# Patient Record
Sex: FEMALE | Race: WHITE | NOT HISPANIC OR LATINO | Employment: FULL TIME | URBAN - METROPOLITAN AREA
[De-identification: names, ages, dates, MRNs, and addresses within clinical notes are randomized per-mention and may not be internally consistent; named-entity substitution may affect disease eponyms.]

---

## 2017-04-03 ENCOUNTER — APPOINTMENT (OUTPATIENT)
Dept: MAMMOGRAPHY | Facility: CLINIC | Age: 52
End: 2017-04-03

## 2017-04-03 ENCOUNTER — OFFICE VISIT (OUTPATIENT)
Dept: OBSTETRICS AND GYNECOLOGY | Facility: CLINIC | Age: 52
End: 2017-04-03

## 2017-04-03 VITALS
SYSTOLIC BLOOD PRESSURE: 134 MMHG | HEIGHT: 66 IN | BODY MASS INDEX: 21.53 KG/M2 | DIASTOLIC BLOOD PRESSURE: 60 MMHG | WEIGHT: 134 LBS

## 2017-04-03 DIAGNOSIS — Z12.31 VISIT FOR SCREENING MAMMOGRAM: ICD-10-CM

## 2017-04-03 DIAGNOSIS — Z01.419 WELL WOMAN EXAM WITH ROUTINE GYNECOLOGICAL EXAM: Primary | ICD-10-CM

## 2017-04-03 PROCEDURE — 77067 SCR MAMMO BI INCL CAD: CPT | Performed by: OBSTETRICS & GYNECOLOGY

## 2017-04-03 PROCEDURE — 99396 PREV VISIT EST AGE 40-64: CPT | Performed by: OBSTETRICS & GYNECOLOGY

## 2017-04-03 NOTE — PROGRESS NOTES
Huber Silveira is a 51 y.o. female is here today as a self referral for annual.    History of Present Illness-here today for annual exam and checkup.    The following portions of the patient's history were reviewed and updated as appropriate: allergies, current medications, past family history, past medical history, past social history, past surgical history and problem list.    Review of Systems   Constitutional: Negative.    HENT: Negative.    Eyes: Negative.    Respiratory: Negative.    Cardiovascular: Negative.    Gastrointestinal: Negative.    Endocrine: Negative.    Genitourinary: Negative.    Musculoskeletal: Negative.    Skin: Negative.    Allergic/Immunologic: Negative.    Neurological: Negative.    Hematological: Negative.    Psychiatric/Behavioral: Negative.        Objective   Physical Exam   Constitutional: She is oriented to person, place, and time. She appears well-developed and well-nourished.   HENT:   Head: Normocephalic and atraumatic.   Nose: Nose normal.   Eyes: Conjunctivae and EOM are normal. Pupils are equal, round, and reactive to light.   Neck: Normal range of motion. Neck supple. No thyromegaly present.   Cardiovascular: Normal rate, regular rhythm, normal heart sounds and intact distal pulses.  Exam reveals no gallop.    No murmur heard.  Pulmonary/Chest: Effort normal and breath sounds normal. No respiratory distress. She has no wheezes. She exhibits no mass, no tenderness, no swelling and no retraction. Right breast exhibits no inverted nipple, no mass, no nipple discharge, no skin change and no tenderness. Left breast exhibits no inverted nipple, no mass, no nipple discharge, no skin change and no tenderness.   Abdominal: Soft. Bowel sounds are normal. She exhibits no distension and no mass. There is no tenderness.   Genitourinary: Rectum normal, vagina normal and uterus normal. There is no rash, tenderness, lesion or injury on the right labia. There is no rash,  tenderness, lesion or injury on the left labia. Uterus is not enlarged and not tender. Cervix exhibits no motion tenderness and no discharge. Right adnexum displays no mass, no tenderness and no fullness. Left adnexum displays no mass, no tenderness and no fullness.   Musculoskeletal: Normal range of motion. She exhibits no edema, tenderness or deformity.   Neurological: She is alert and oriented to person, place, and time.   Skin: Skin is warm and dry.   Psychiatric: She has a normal mood and affect. Her behavior is normal. Judgment and thought content normal.   Nursing note and vitals reviewed.        Assessment/Plan   Problems Addressed this Visit     None      Visit Diagnoses     Well woman exam with routine gynecological exam    -  Primary        Pap smear and mammogram done.  Current with colonoscopy.  DEXA scan discussed about mid 50s.

## 2017-04-05 LAB
CONV .: NORMAL
CYTOLOGIST CVX/VAG CYTO: NORMAL
CYTOLOGY CVX/VAG DOC THIN PREP: NORMAL
DX ICD CODE: NORMAL
HIV 1 & 2 AB SER-IMP: NORMAL
OTHER STN SPEC: NORMAL
PATH REPORT.FINAL DX SPEC: NORMAL
STAT OF ADQ CVX/VAG CYTO-IMP: NORMAL

## 2018-03-09 ENCOUNTER — TRANSCRIBE ORDERS (OUTPATIENT)
Dept: ADMINISTRATIVE | Facility: HOSPITAL | Age: 53
End: 2018-03-09

## 2018-03-09 DIAGNOSIS — Z12.39 SCREENING BREAST EXAMINATION: Primary | ICD-10-CM

## 2018-04-06 ENCOUNTER — OFFICE VISIT (OUTPATIENT)
Dept: OBSTETRICS AND GYNECOLOGY | Facility: CLINIC | Age: 53
End: 2018-04-06

## 2018-04-06 ENCOUNTER — HOSPITAL ENCOUNTER (OUTPATIENT)
Dept: MAMMOGRAPHY | Facility: HOSPITAL | Age: 53
Discharge: HOME | End: 2018-04-06
Attending: OBSTETRICS & GYNECOLOGY | Admitting: OBSTETRICS & GYNECOLOGY

## 2018-04-06 VITALS
SYSTOLIC BLOOD PRESSURE: 130 MMHG | DIASTOLIC BLOOD PRESSURE: 80 MMHG | HEIGHT: 66 IN | WEIGHT: 130 LBS | BODY MASS INDEX: 20.89 KG/M2

## 2018-04-06 DIAGNOSIS — Z12.39 SCREENING BREAST EXAMINATION: ICD-10-CM

## 2018-04-06 DIAGNOSIS — Z01.419 WELL WOMAN EXAM WITH ROUTINE GYNECOLOGICAL EXAM: Primary | ICD-10-CM

## 2018-04-06 PROCEDURE — 77067 SCR MAMMO BI INCL CAD: CPT

## 2018-04-06 PROCEDURE — 99396 PREV VISIT EST AGE 40-64: CPT | Performed by: OBSTETRICS & GYNECOLOGY

## 2018-04-06 NOTE — PROGRESS NOTES
Huber Silveira is a 52 y.o. female is here today as a self referral for annual.    History of Present Illness-Here today for annual exam and checkup    The following portions of the patient's history were reviewed and updated as appropriate: allergies, current medications, past family history, past medical history, past social history, past surgical history and problem list.    Review of Systems   Constitutional: Negative.    HENT: Negative.    Eyes: Negative.    Respiratory: Negative.    Cardiovascular: Negative.    Gastrointestinal: Negative.    Endocrine: Negative.    Genitourinary: Negative.    Musculoskeletal: Negative.    Skin: Negative.    Allergic/Immunologic: Negative.    Neurological: Negative.    Hematological: Negative.    Psychiatric/Behavioral: Negative.        Objective   Physical Exam   Constitutional: She is oriented to person, place, and time. She appears well-developed and well-nourished.   HENT:   Head: Normocephalic and atraumatic.   Nose: Nose normal.   Eyes: Conjunctivae and EOM are normal. Pupils are equal, round, and reactive to light.   Neck: Normal range of motion. Neck supple. No thyromegaly present.   Cardiovascular: Normal rate, regular rhythm, normal heart sounds and intact distal pulses.  Exam reveals no gallop.    No murmur heard.  Pulmonary/Chest: Effort normal and breath sounds normal. No respiratory distress. She has no wheezes. She exhibits no mass, no tenderness, no swelling and no retraction. Right breast exhibits no inverted nipple, no mass, no nipple discharge, no skin change and no tenderness. Left breast exhibits no inverted nipple, no mass, no nipple discharge, no skin change and no tenderness.   Abdominal: Soft. Bowel sounds are normal. She exhibits no distension and no mass. There is no tenderness.   Genitourinary: Rectum normal, vagina normal and uterus normal. There is no rash, tenderness, lesion or injury on the right labia. There is no rash,  tenderness, lesion or injury on the left labia. Uterus is not enlarged and not tender. Cervix exhibits no motion tenderness and no discharge. Right adnexum displays no mass, no tenderness and no fullness. Left adnexum displays no mass, no tenderness and no fullness.   Musculoskeletal: Normal range of motion. She exhibits no edema, tenderness or deformity.   Neurological: She is alert and oriented to person, place, and time.   Skin: Skin is warm and dry.   Psychiatric: She has a normal mood and affect. Her behavior is normal. Judgment and thought content normal.   Nursing note and vitals reviewed.        Assessment/Plan   Problems Addressed this Visit     None      Visit Diagnoses     Well woman exam with routine gynecological exam    -  Primary    Relevant Orders    IGP,rfx Aptima HPV All Pth        Pap smear and mammogram done today.  Current with colonoscopy.  DEXA scan planned for next annual checkup.

## 2018-04-13 LAB
CONV .: ABNORMAL
CYTOLOGIST CVX/VAG CYTO: ABNORMAL
CYTOLOGY CVX/VAG DOC THIN PREP: ABNORMAL
DX ICD CODE: ABNORMAL
DX ICD CODE: ABNORMAL
HIV 1 & 2 AB SER-IMP: ABNORMAL
HPV I/H RISK 4 DNA CVX QL PROBE+SIG AMP: POSITIVE
OTHER STN SPEC: ABNORMAL
PATH REPORT.FINAL DX SPEC: ABNORMAL
PATHOLOGIST CVX/VAG CYTO: ABNORMAL
STAT OF ADQ CVX/VAG CYTO-IMP: ABNORMAL

## 2018-04-26 ENCOUNTER — OFFICE VISIT (OUTPATIENT)
Dept: OBSTETRICS AND GYNECOLOGY | Facility: CLINIC | Age: 53
End: 2018-04-26

## 2018-04-26 VITALS — SYSTOLIC BLOOD PRESSURE: 130 MMHG | DIASTOLIC BLOOD PRESSURE: 78 MMHG

## 2018-04-26 DIAGNOSIS — R87.618 ABNORMAL PAPANICOLAOU SMEAR OF CERVIX WITH POSITIVE HUMAN PAPILLOMA VIRUS (HPV) TEST: Primary | ICD-10-CM

## 2018-04-26 PROCEDURE — 57456 ENDOCERV CURETTAGE W/SCOPE: CPT | Performed by: OBSTETRICS & GYNECOLOGY

## 2018-04-26 NOTE — PROGRESS NOTES
Procedure   Procedures-Colposcopy and biopsy    Procedure-speculum placed into the vagina and 2 applications of acetic acid.  There was no evidence of abnormal vessels or white epithelium.  Spira brush biopsy obtained and included the endocervix.  Silver nitrate was applied to the biopsied surface.  There was minimal bleeding and patient tolerated the procedure well.  Patient to call in 1 week for biopsy report.

## 2018-05-01 LAB
DX ICD CODE: NORMAL
DX ICD CODE: NORMAL
PATH REPORT.FINAL DX SPEC: NORMAL
PATH REPORT.GROSS SPEC: NORMAL
PATH REPORT.SITE OF ORIGIN SPEC: NORMAL
PATHOLOGIST NAME: NORMAL
PAYMENT PROCEDURE: NORMAL

## 2019-04-23 ENCOUNTER — APPOINTMENT (OUTPATIENT)
Dept: WOMENS IMAGING | Facility: HOSPITAL | Age: 54
End: 2019-04-23

## 2019-04-23 ENCOUNTER — PROCEDURE VISIT (OUTPATIENT)
Dept: OBSTETRICS AND GYNECOLOGY | Facility: CLINIC | Age: 54
End: 2019-04-23

## 2019-04-23 DIAGNOSIS — Z12.31 VISIT FOR SCREENING MAMMOGRAM: Primary | ICD-10-CM

## 2019-04-23 PROCEDURE — 77067 SCR MAMMO BI INCL CAD: CPT | Performed by: OBSTETRICS & GYNECOLOGY

## 2019-04-23 PROCEDURE — 77067 SCR MAMMO BI INCL CAD: CPT | Performed by: RADIOLOGY

## 2019-07-17 ENCOUNTER — OFFICE VISIT (OUTPATIENT)
Dept: OBSTETRICS AND GYNECOLOGY | Facility: CLINIC | Age: 54
End: 2019-07-17

## 2019-07-17 VITALS
SYSTOLIC BLOOD PRESSURE: 120 MMHG | DIASTOLIC BLOOD PRESSURE: 82 MMHG | BODY MASS INDEX: 21.38 KG/M2 | WEIGHT: 133 LBS | HEIGHT: 66 IN

## 2019-07-17 DIAGNOSIS — Z01.419 ENCOUNTER FOR GYNECOLOGICAL EXAMINATION WITHOUT ABNORMAL FINDING: Primary | ICD-10-CM

## 2019-07-17 DIAGNOSIS — Z87.42 HISTORY OF ABNORMAL CERVICAL PAP SMEAR: ICD-10-CM

## 2019-07-17 DIAGNOSIS — Z12.39 SCREENING FOR BREAST CANCER: ICD-10-CM

## 2019-07-17 DIAGNOSIS — N95.1 CLIMACTERIC: ICD-10-CM

## 2019-07-17 LAB
DEVELOPER EXPIRATION DATE: NORMAL
DEVELOPER LOT NUMBER: NORMAL
EXPIRATION DATE: NORMAL
FECAL OCCULT BLOOD SCREEN, POC: NEGATIVE
Lab: NORMAL
NEGATIVE CONTROL: NEGATIVE
POSITIVE CONTROL: POSITIVE

## 2019-07-17 PROCEDURE — 99396 PREV VISIT EST AGE 40-64: CPT | Performed by: OBSTETRICS & GYNECOLOGY

## 2019-07-17 PROCEDURE — 82274 ASSAY TEST FOR BLOOD FECAL: CPT | Performed by: OBSTETRICS & GYNECOLOGY

## 2019-07-17 NOTE — PROGRESS NOTES
Subjective    Chief Complaint   Patient presents with   • Gynecologic Exam     AE      History of Present Illness    Ayana Silveira is a 53 y.o. female who presents for annual exam.  Non-smoker.  Mammogram 2019-.  Patient had previous ASCUS positive HPV with HPV changes on biopsy.  Planning repeat Pap and DEXA scan in 6 months.  Colonoscopy at age 48 was negative and due again at age 58.  No bleeding and no problems.    Obstetric History:  OB History      Para Term  AB Living    3 3 3          SAB TAB Ectopic Molar Multiple Live Births                        Menstrual History:     No LMP recorded. Patient is postmenopausal.       History reviewed. No pertinent past medical history.  Family History   Problem Relation Age of Onset   • Breast cancer Maternal Grandmother    • Breast cancer Paternal Grandmother      Social History     Tobacco Use   Smoking Status Never Smoker     Counseling given: Not Answered      The following portions of the patient's history were reviewed and updated as appropriate: allergies, current medications, past family history, past medical history, past social history, past surgical history and problem list.    Review of Systems   Constitutional: Negative.  Negative for fever and unexpected weight change.   HENT: Negative.    Respiratory: Negative for shortness of breath and wheezing.    Cardiovascular: Negative for chest pain, palpitations and leg swelling.   Gastrointestinal: Negative for abdominal pain, anal bleeding and blood in stool.   Genitourinary: Negative for dysuria, pelvic pain, urgency, vaginal bleeding, vaginal discharge and vaginal pain.   Skin: Negative.    Neurological: Negative.    Hematological: Negative.  Negative for adenopathy.   Psychiatric/Behavioral: Negative.  Negative for dysphoric mood. The patient is not nervous/anxious.             Objective   Physical Exam   Constitutional: She is oriented to person, place, and time. Vital signs are normal.  "She appears well-developed and well-nourished.   HENT:   Head: Normocephalic.   Neck: Trachea normal. No tracheal deviation present. No thyromegaly present.   Cardiovascular: Normal rate, regular rhythm and normal heart sounds.   No murmur heard.  Pulmonary/Chest: Effort normal and breath sounds normal.   Breasts without masses, tenderness or nipple discharge   Abdominal: Soft. Normal appearance. She exhibits no mass. There is no hepatosplenomegaly. There is no tenderness. No hernia.   Genitourinary: Rectum normal, vagina normal and uterus normal. Rectal exam shows guaiac negative stool. Uterus is not enlarged and not tender. Cervix exhibits no motion tenderness. Right adnexum displays no mass and no tenderness. Left adnexum displays no mass and no tenderness. No vaginal discharge found.   Genitourinary Comments: External genitalia normal    Lymphadenopathy:     She has no cervical adenopathy.     She has no axillary adenopathy.   Neurological: She is alert and oriented to person, place, and time.   Skin: Skin is warm and dry. No rash noted.   Psychiatric: She has a normal mood and affect. Her behavior is normal. Cognition and memory are normal.       /82   Ht 167.6 cm (66\")   Wt 60.3 kg (133 lb)   BMI 21.47 kg/m²     Assessment/Plan   Ayana was seen today for gynecologic exam.    Diagnoses and all orders for this visit:    Encounter for gynecological examination without abnormal finding  -     IGP,rfx Aptima HPV All Pth  -     POC Occult Blood Stool    Climacteric    Screening for breast cancer        RTO 1 year     Counseled about starting calcium with vitamin D twice daily and scheduling DEXA scan.         "

## 2019-07-22 LAB
CONV .: NORMAL
CYTOLOGIST CVX/VAG CYTO: NORMAL
CYTOLOGY CVX/VAG DOC CYTO: NORMAL
CYTOLOGY CVX/VAG DOC THIN PREP: NORMAL
DX ICD CODE: NORMAL
HIV 1 & 2 AB SER-IMP: NORMAL
OTHER STN SPEC: NORMAL
STAT OF ADQ CVX/VAG CYTO-IMP: NORMAL

## 2019-11-21 ENCOUNTER — OFFICE VISIT (OUTPATIENT)
Dept: OBSTETRICS AND GYNECOLOGY | Facility: CLINIC | Age: 54
End: 2019-11-21

## 2019-11-21 VITALS
HEIGHT: 66 IN | SYSTOLIC BLOOD PRESSURE: 162 MMHG | WEIGHT: 133 LBS | DIASTOLIC BLOOD PRESSURE: 101 MMHG | BODY MASS INDEX: 21.38 KG/M2

## 2019-11-21 DIAGNOSIS — N64.4 BREAST PAIN, LEFT: Primary | ICD-10-CM

## 2019-11-21 DIAGNOSIS — N64.4 BREAST TENDERNESS IN FEMALE: Primary | ICD-10-CM

## 2019-11-21 PROCEDURE — 99213 OFFICE O/P EST LOW 20 MIN: CPT | Performed by: OBSTETRICS & GYNECOLOGY

## 2019-11-21 NOTE — PROGRESS NOTES
"Subjective    Chief Complaint   Patient presents with   • Follow-up     Left Breast Pain, Tenderness      History of Present Illness    Ayana Silveira is a 54 y.o. female who presents for new onset over the last several 7 days of left breast tenderness.  No mass or other associated symptoms.  Mammogram 2019 was totally negative.  Patient does not take a lot of caffeine.    Obstetric History:  OB History      Para Term  AB Living    3 3 3          SAB TAB Ectopic Molar Multiple Live Births                        Menstrual History:     No LMP recorded. Patient is postmenopausal.       History reviewed. No pertinent past medical history.  Family History   Problem Relation Age of Onset   • Breast cancer Maternal Grandmother    • Breast cancer Paternal Grandmother        The following portions of the patient's history were reviewed and updated as appropriate: allergies, current medications, past family history, past medical history, past social history and problem list.    Review of Systems  As per HPI       Objective   Physical Exam  Examination of right breast totally negative for tenderness or mass.  Examination of left breast tenderness noted in the outer quadrants with no mass or nipple discharge.  Due to recent exercise could be musculoskeletal but fibrocystic disease also a possibility.  BP (!) 162/101   Ht 167.6 cm (66\")   Wt 60.3 kg (133 lb)   BMI 21.47 kg/m²     Assessment/Plan   Ayana was seen today for follow-up.    Diagnoses and all orders for this visit:    Breast pain, left        Scheduling left diagnostic mammogram.  Since does not take much caffeine, will start 400 IU vitamin D a day.  If mammogram negative the pain continues patient will call to schedule a possible consultation with breast surgeon.  Patient is happy with this plan.               "

## 2019-12-10 ENCOUNTER — APPOINTMENT (OUTPATIENT)
Dept: WOMENS IMAGING | Facility: HOSPITAL | Age: 54
End: 2019-12-10

## 2019-12-10 PROCEDURE — G0279 TOMOSYNTHESIS, MAMMO: HCPCS | Performed by: RADIOLOGY

## 2019-12-10 PROCEDURE — 77065 DX MAMMO INCL CAD UNI: CPT | Performed by: RADIOLOGY

## 2019-12-10 PROCEDURE — 76641 ULTRASOUND BREAST COMPLETE: CPT | Performed by: RADIOLOGY

## 2019-12-10 PROCEDURE — 77061 BREAST TOMOSYNTHESIS UNI: CPT | Performed by: RADIOLOGY

## 2019-12-11 DIAGNOSIS — N64.4 BREAST TENDERNESS IN FEMALE: ICD-10-CM

## 2020-01-06 DIAGNOSIS — N64.4 BREAST TENDERNESS: ICD-10-CM

## 2020-01-13 ENCOUNTER — OFFICE VISIT (OUTPATIENT)
Dept: OBSTETRICS AND GYNECOLOGY | Facility: CLINIC | Age: 55
End: 2020-01-13

## 2020-01-13 ENCOUNTER — PROCEDURE VISIT (OUTPATIENT)
Dept: OBSTETRICS AND GYNECOLOGY | Facility: CLINIC | Age: 55
End: 2020-01-13

## 2020-01-13 VITALS
DIASTOLIC BLOOD PRESSURE: 87 MMHG | SYSTOLIC BLOOD PRESSURE: 130 MMHG | WEIGHT: 136 LBS | BODY MASS INDEX: 21.86 KG/M2 | HEIGHT: 66 IN

## 2020-01-13 DIAGNOSIS — Z00.00 PREVENTATIVE HEALTH CARE: ICD-10-CM

## 2020-01-13 DIAGNOSIS — Z87.42 HISTORY OF ABNORMAL CERVICAL PAP SMEAR: Primary | ICD-10-CM

## 2020-01-13 DIAGNOSIS — M85.80 OSTEOPENIA, UNSPECIFIED LOCATION: ICD-10-CM

## 2020-01-13 DIAGNOSIS — Z78.0 POST-MENOPAUSAL: Primary | ICD-10-CM

## 2020-01-13 DIAGNOSIS — Z87.81 HISTORY OF FRACTURE: ICD-10-CM

## 2020-01-13 PROCEDURE — 77080 DXA BONE DENSITY AXIAL: CPT | Performed by: OBSTETRICS & GYNECOLOGY

## 2020-01-13 PROCEDURE — 99213 OFFICE O/P EST LOW 20 MIN: CPT | Performed by: OBSTETRICS & GYNECOLOGY

## 2020-01-13 NOTE — PROGRESS NOTES
"Subjective    Chief Complaint   Patient presents with   • Follow-up     Pt here for 6 month pap smear. Pt had DEXA scan today.       History of Present Illness    Ayana Silveira is a 54 y.o. female who presents for 6-month repeat Pap smear today.  Last Pap in July negative.  Previously had ASCUS positive HPV with cervical biopsy showing HPV changes.  Also had DEXA scan today which showed osteopenia of the hips but normal spine.  Patient on calcium and vitamin D so will recheck in 2 years.  Patient states that her breast tenderness has totally resolved.    Obstetric History:  OB History        3    Para   3    Term   3            AB        Living           SAB        TAB        Ectopic        Molar        Multiple        Live Births                   Menstrual History:     No LMP recorded. Patient is postmenopausal.       History reviewed. No pertinent past medical history.  Family History   Problem Relation Age of Onset   • Breast cancer Maternal Grandmother    • Breast cancer Paternal Grandmother        The following portions of the patient's history were reviewed and updated as appropriate: allergies, current medications, past medical history, past surgical history and problem list.    Review of Systems   Negative    Objective   Physical Exam  Vagina clear without blood or discharge  Cervix without lesion.  Pap smear performed.  Bimanual exam negative.  /87   Ht 167.6 cm (66\")   Wt 61.7 kg (136 lb)   BMI 21.95 kg/m²     Assessment/Plan   Ayana was seen today for follow-up.    Diagnoses and all orders for this visit:    History of abnormal cervical Pap smear    Osteopenia, unspecified location        Repeat in 6 months for annual exam with Pap smear and screening mammogram.               "

## 2020-07-20 ENCOUNTER — PROCEDURE VISIT (OUTPATIENT)
Dept: OBSTETRICS AND GYNECOLOGY | Facility: CLINIC | Age: 55
End: 2020-07-20

## 2020-07-20 ENCOUNTER — APPOINTMENT (OUTPATIENT)
Dept: WOMENS IMAGING | Facility: HOSPITAL | Age: 55
End: 2020-07-20

## 2020-07-20 ENCOUNTER — OFFICE VISIT (OUTPATIENT)
Dept: OBSTETRICS AND GYNECOLOGY | Facility: CLINIC | Age: 55
End: 2020-07-20

## 2020-07-20 VITALS
DIASTOLIC BLOOD PRESSURE: 80 MMHG | HEIGHT: 66 IN | BODY MASS INDEX: 22.02 KG/M2 | WEIGHT: 137 LBS | SYSTOLIC BLOOD PRESSURE: 130 MMHG

## 2020-07-20 DIAGNOSIS — N89.8 VAGINAL DISCHARGE: ICD-10-CM

## 2020-07-20 DIAGNOSIS — Z01.419 WOMEN'S ANNUAL ROUTINE GYNECOLOGICAL EXAMINATION: Primary | ICD-10-CM

## 2020-07-20 DIAGNOSIS — Z12.31 VISIT FOR SCREENING MAMMOGRAM: Primary | ICD-10-CM

## 2020-07-20 PROCEDURE — 77067 SCR MAMMO BI INCL CAD: CPT | Performed by: OBSTETRICS & GYNECOLOGY

## 2020-07-20 PROCEDURE — 77067 SCR MAMMO BI INCL CAD: CPT | Performed by: RADIOLOGY

## 2020-07-20 PROCEDURE — 99396 PREV VISIT EST AGE 40-64: CPT | Performed by: NURSE PRACTITIONER

## 2020-07-20 PROCEDURE — 77063 BREAST TOMOSYNTHESIS BI: CPT | Performed by: OBSTETRICS & GYNECOLOGY

## 2020-07-20 PROCEDURE — 77063 BREAST TOMOSYNTHESIS BI: CPT | Performed by: RADIOLOGY

## 2020-07-20 NOTE — PROGRESS NOTES
GYN Annual Exam     Chief Complaint   Patient presents with   • Gynecologic Exam     Last pap 20 (was a 6 month repeat) negative, no HPV. MG today. Colonoscopy . Dexa 20.        Ayana Silveira is a 54 y.o. female who presents for annual well woman exam. Periods absent for 10 days. She denies vaginal spotting or discharge. She completes SBE monthly. She has no complaints today    This is my first time meeting Ayana Silveira      OB History        3    Para   3    Term   3            AB        Living           SAB        TAB        Ectopic        Molar        Multiple        Live Births                    Mammogram: Today  Dexa scan:   Colonoscopy: -normal, rec f/u in 10 years  Last Pap : 2020-negative  History of abnormal Pap smear: yes -   Family history of uterine, colon or ovarian cancer: no  Family history of breast cancer: yes - MGM and PGM   History of abnormal mammogram: no    Menopause:  Bleeding since? no  Vasomotor symptoms: no  HRT: no  Incontinence?  no  Dyspareunia: no    History of physical abuse: no, History of sexual abuse: no and History of verbal/emotional abuse: no    Past Medical History:   Diagnosis Date   • Post-menopausal        Past Surgical History:   Procedure Laterality Date   • COLONOSCOPY     • TONSILLECTOMY           Current Outpatient Medications:   •  Calcium 500-100 MG-UNIT chewable tablet, Chew 1 tablet Every 4 (Four) Hours As Needed., Disp: , Rfl:   •  vitamin E 200 UNIT capsule, Take 200 Units by mouth Daily., Disp: , Rfl:     Allergies   Allergen Reactions   • Sulfa Antibiotics        Social History     Tobacco Use   • Smoking status: Never Smoker   Substance Use Topics   • Alcohol use: Yes     Frequency: Never   • Drug use: Not on file       Family History   Problem Relation Age of Onset   • Breast cancer Maternal Grandmother    • Breast cancer Paternal Grandmother        Review of Systems   Constitutional: Negative for chills  "and fever.   Gastrointestinal: Negative for abdominal distention and abdominal pain.   Endocrine: Negative for cold intolerance and heat intolerance.   Genitourinary: Negative for dyspareunia, dysuria, pelvic pain, vaginal bleeding, vaginal discharge and vaginal pain.   Musculoskeletal: Negative for back pain.   Neurological: Negative for dizziness and headaches.       /80   Ht 167.6 cm (66\")   Wt 62.1 kg (137 lb)   BMI 22.11 kg/m²     Physical Exam   Constitutional: She is oriented to person, place, and time. She appears well-developed and well-nourished.   HENT:   Head: Normocephalic and atraumatic.   Eyes: Pupils are equal, round, and reactive to light. Right eye exhibits no discharge.   Neck: Normal range of motion. Neck supple. No thyromegaly present.   Cardiovascular: Normal rate, regular rhythm and normal heart sounds.   No murmur heard.  Pulmonary/Chest: Effort normal and breath sounds normal. No respiratory distress. She has no wheezes. Right breast exhibits no inverted nipple, no mass, no nipple discharge, no skin change and no tenderness. Left breast exhibits no inverted nipple, no mass, no nipple discharge, no skin change and no tenderness. No breast swelling, tenderness, discharge or bleeding. Breasts are symmetrical.   Abdominal: Soft. She exhibits no distension and no mass. There is no tenderness. There is no rebound and no guarding. No hernia. Hernia confirmed negative in the right inguinal area and confirmed negative in the left inguinal area.   Genitourinary: Uterus normal. No labial fusion. There is no rash, tenderness, lesion, injury or Bartholin's cyst on the right labia. There is no rash, tenderness, lesion, injury or Bartholin's cyst on the left labia.   Cervix is not parous. Cervix does not exhibit motion tenderness, discharge, friability, lesion, polyp, nabothian cyst, eversion or pinkness. Right adnexum displays no mass, no tenderness and no fullness. Right adnexum is present and " palpable.Left adnexum displays no mass, no tenderness and no fullness. Left adnexum is present and palpable.Vagina exhibits no lesion and no loss of rugae. No erythema, tenderness or bleeding in the vagina. No foreign body in the vagina. No signs of injury around the vagina. Vaginal discharge (thick white discharge) found.   Musculoskeletal: Normal range of motion. She exhibits no edema.   Lymphadenopathy:     She has no cervical adenopathy.        Right: No inguinal adenopathy present.        Left: No inguinal adenopathy present.   Neurological: She is alert and oriented to person, place, and time. No cranial nerve deficit. Coordination normal.   Skin: Skin is warm and dry. No erythema.   Psychiatric: She has a normal mood and affect. Her behavior is normal. Judgment and thought content normal.   Nursing note and vitals reviewed.         Assessment     1) GYN annual well woman exam.   2) Postmenopausal      Plan     1) Breast Health - Clinical breast exam & mammogram yearly, Self breast awareness. Schedule screening mammogram.   2) Pap - up to date  3) STD-no  4) Smoking status- nonsmoker  5) Colon health - screening colonoscopy recommended if not up to date  6) Patient's Body mass index is 22.11 kg/m². BMI is within normal parameters. No follow-up required..  7) Bone health - Weight bearing exercise, dietary calcium recommendations and vitamin D  reviewed.   8) Encouraged 150 minutes of exercise per week if not medically contraindicated  9) NuSwab    Follow up prn and one year    Lara Carr, APRN  7/20/2020  12:18

## 2020-07-22 ENCOUNTER — TELEPHONE (OUTPATIENT)
Dept: OBSTETRICS AND GYNECOLOGY | Facility: CLINIC | Age: 55
End: 2020-07-22

## 2020-07-22 NOTE — TELEPHONE ENCOUNTER
Calling Ayana to discuss pap smear. No answer left message to return my call.    Lara Carr, APRN  7/22/2020  8:45am

## 2020-07-23 ENCOUNTER — TELEPHONE (OUTPATIENT)
Dept: OBSTETRICS AND GYNECOLOGY | Facility: CLINIC | Age: 55
End: 2020-07-23

## 2020-07-23 NOTE — TELEPHONE ENCOUNTER
Called pt and notified of need to collect pap smear. She is planning to come in on 7/28/20 after 9AM    Lara Carr, APRN  7/23/20  12:23pm

## 2020-07-28 ENCOUNTER — CLINICAL SUPPORT (OUTPATIENT)
Dept: OBSTETRICS AND GYNECOLOGY | Facility: CLINIC | Age: 55
End: 2020-07-28

## 2020-07-28 DIAGNOSIS — Z87.42 H/O ABNORMAL CERVICAL PAPANICOLAOU SMEAR: Primary | ICD-10-CM

## 2020-07-28 LAB
A VAGINAE DNA VAG QL NAA+PROBE: NORMAL SCORE
BVAB2 DNA VAG QL NAA+PROBE: NORMAL SCORE
C ALBICANS DNA VAG QL NAA+PROBE: NEGATIVE
C GLABRATA DNA VAG QL NAA+PROBE: NEGATIVE
MEGA1 DNA VAG QL NAA+PROBE: NORMAL SCORE

## 2020-07-28 NOTE — PROGRESS NOTES
No chief complaint on file.       SUBJECTIVE:     Ayana Silveira is a 54 y.o.  who presents for pap smear testing only. Annual exam completed recently, pap smear was not collected at that time.    Past Medical History:   Diagnosis Date   • Post-menopausal       Past Surgical History:   Procedure Laterality Date   • COLONOSCOPY     • TONSILLECTOMY        Social History     Tobacco Use   • Smoking status: Never Smoker   Substance Use Topics   • Alcohol use: Yes     Frequency: Never   • Drug use: Not on file     OB History    Para Term  AB Living   3 3 3         SAB TAB Ectopic Molar Multiple Live Births                    # Outcome Date GA Lbr Wali/2nd Weight Sex Delivery Anes PTL Lv   3 Term            2 Term            1 Term                   OBJECTIVE:       Physical Exam   Abdominal: Hernia confirmed negative in the right inguinal area and confirmed negative in the left inguinal area.   Genitourinary: Uterus normal. No labial fusion. There is no rash, tenderness, lesion, injury or Bartholin's cyst on the right labia. There is no rash, tenderness, lesion, injury or Bartholin's cyst on the left labia.   Cervix is not parous. Cervix does not exhibit motion tenderness, discharge, friability, lesion, polyp, nabothian cyst, eversion, pinkness or cyanosis. Right adnexum displays no mass, no tenderness and no fullness. Right adnexum is present and palpable.Left adnexum displays no mass, no tenderness and no fullness. Left adnexum is present and palpable.Vagina exhibits no lesion and no loss of rugae. No erythema, tenderness or bleeding in the vagina. No foreign body in the vagina. No signs of injury around the vagina. No vaginal discharge found.   Lymphadenopathy:        Right: No inguinal adenopathy present.        Left: No inguinal adenopathy present.       ASSESSMENT:   1) Hx of abnormal pap smear    PLAN:   Pap smear with HPV co-testing completed today    Follow up: one year for annual gyn  exam or PRN problems or concerns      Lara Carr, APRN  7/28/2020  10:45

## 2020-07-31 LAB
CYTOLOGIST CVX/VAG CYTO: NORMAL
CYTOLOGY CVX/VAG DOC CYTO: NORMAL
CYTOLOGY CVX/VAG DOC THIN PREP: NORMAL
DX ICD CODE: NORMAL
HIV 1 & 2 AB SER-IMP: NORMAL
HPV I/H RISK 4 DNA CVX QL PROBE+SIG AMP: NEGATIVE
OTHER STN SPEC: NORMAL
STAT OF ADQ CVX/VAG CYTO-IMP: NORMAL

## 2021-03-01 ENCOUNTER — OFFICE VISIT (OUTPATIENT)
Dept: FAMILY MEDICINE CLINIC | Facility: CLINIC | Age: 56
End: 2021-03-01

## 2021-03-01 VITALS
RESPIRATION RATE: 14 BRPM | HEART RATE: 67 BPM | OXYGEN SATURATION: 98 % | TEMPERATURE: 96.8 F | HEIGHT: 66 IN | BODY MASS INDEX: 22.58 KG/M2 | DIASTOLIC BLOOD PRESSURE: 82 MMHG | WEIGHT: 140.5 LBS | SYSTOLIC BLOOD PRESSURE: 134 MMHG

## 2021-03-01 DIAGNOSIS — Z00.00 ANNUAL PHYSICAL EXAM: Primary | ICD-10-CM

## 2021-03-01 DIAGNOSIS — Z11.59 ENCOUNTER FOR HEPATITIS C SCREENING TEST FOR LOW RISK PATIENT: ICD-10-CM

## 2021-03-01 DIAGNOSIS — E78.5 HYPERLIPIDEMIA, UNSPECIFIED HYPERLIPIDEMIA TYPE: ICD-10-CM

## 2021-03-01 DIAGNOSIS — R10.11 RIGHT UPPER QUADRANT PAIN: ICD-10-CM

## 2021-03-01 PROBLEM — S46.919A MUSCLE STRAIN OF UPPER EXTREMITY: Status: RESOLVED | Noted: 2021-03-01 | Resolved: 2021-03-01

## 2021-03-01 PROBLEM — S46.919A MUSCLE STRAIN OF UPPER EXTREMITY: Status: ACTIVE | Noted: 2021-03-01

## 2021-03-01 PROCEDURE — 99396 PREV VISIT EST AGE 40-64: CPT | Performed by: FAMILY MEDICINE
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
## 2021-03-02 LAB
ALBUMIN SERPL-MCNC: 4.7 G/DL (ref 3.5–5.2)
ALBUMIN/GLOB SERPL: 1.8 G/DL
ALP SERPL-CCNC: 47 U/L (ref 39–117)
ALT SERPL-CCNC: 49 U/L (ref 1–33)
AST SERPL-CCNC: 49 U/L (ref 1–32)
BASOPHILS # BLD AUTO: 0.06 10*3/MM3 (ref 0–0.2)
BASOPHILS NFR BLD AUTO: 1 % (ref 0–1.5)
BILIRUB SERPL-MCNC: 0.7 MG/DL (ref 0–1.2)
BUN SERPL-MCNC: 10 MG/DL (ref 6–20)
BUN/CREAT SERPL: 15.4 (ref 7–25)
CALCIUM SERPL-MCNC: 9.6 MG/DL (ref 8.6–10.5)
CHLORIDE SERPL-SCNC: 101 MMOL/L (ref 98–107)
CHOLEST SERPL-MCNC: 315 MG/DL (ref 0–200)
CO2 SERPL-SCNC: 27.2 MMOL/L (ref 22–29)
CREAT SERPL-MCNC: 0.65 MG/DL (ref 0.57–1)
EOSINOPHIL # BLD AUTO: 0.11 10*3/MM3 (ref 0–0.4)
EOSINOPHIL NFR BLD AUTO: 1.8 % (ref 0.3–6.2)
ERYTHROCYTE [DISTWIDTH] IN BLOOD BY AUTOMATED COUNT: 12 % (ref 12.3–15.4)
GLOBULIN SER CALC-MCNC: 2.6 GM/DL
GLUCOSE SERPL-MCNC: 94 MG/DL (ref 65–99)
HCT VFR BLD AUTO: 39.3 % (ref 34–46.6)
HCV AB S/CO SERPL IA: <0.1 S/CO RATIO (ref 0–0.9)
HDLC SERPL-MCNC: 110 MG/DL (ref 40–60)
HGB BLD-MCNC: 13.6 G/DL (ref 12–15.9)
IMM GRANULOCYTES # BLD AUTO: 0.01 10*3/MM3 (ref 0–0.05)
IMM GRANULOCYTES NFR BLD AUTO: 0.2 % (ref 0–0.5)
LDLC SERPL CALC-MCNC: 184 MG/DL (ref 0–100)
LYMPHOCYTES # BLD AUTO: 2.02 10*3/MM3 (ref 0.7–3.1)
LYMPHOCYTES NFR BLD AUTO: 33.2 % (ref 19.6–45.3)
MCH RBC QN AUTO: 35.4 PG (ref 26.6–33)
MCHC RBC AUTO-ENTMCNC: 34.6 G/DL (ref 31.5–35.7)
MCV RBC AUTO: 102.3 FL (ref 79–97)
MONOCYTES # BLD AUTO: 0.63 10*3/MM3 (ref 0.1–0.9)
MONOCYTES NFR BLD AUTO: 10.3 % (ref 5–12)
NEUTROPHILS # BLD AUTO: 3.26 10*3/MM3 (ref 1.7–7)
NEUTROPHILS NFR BLD AUTO: 53.5 % (ref 42.7–76)
NRBC BLD AUTO-RTO: 0 /100 WBC (ref 0–0.2)
PLATELET # BLD AUTO: 240 10*3/MM3 (ref 140–450)
POTASSIUM SERPL-SCNC: 4.9 MMOL/L (ref 3.5–5.2)
PROT SERPL-MCNC: 7.3 G/DL (ref 6–8.5)
RBC # BLD AUTO: 3.84 10*6/MM3 (ref 3.77–5.28)
SODIUM SERPL-SCNC: 141 MMOL/L (ref 136–145)
TRIGL SERPL-MCNC: 126 MG/DL (ref 0–150)
VLDLC SERPL CALC-MCNC: 21 MG/DL (ref 5–40)
WBC # BLD AUTO: 6.09 10*3/MM3 (ref 3.4–10.8)

## 2021-03-03 ENCOUNTER — TELEPHONE (OUTPATIENT)
Dept: FAMILY MEDICINE CLINIC | Facility: CLINIC | Age: 56
End: 2021-03-03

## 2021-03-03 NOTE — TELEPHONE ENCOUNTER
Caller: Ayana Silveira    Relationship: Self    Best call back number: [phone]       Caller requesting test results: LABS DONE ON 3/1

## 2021-03-06 DIAGNOSIS — R74.8 ELEVATED LIVER ENZYMES: Primary | ICD-10-CM

## 2021-03-09 ENCOUNTER — HOSPITAL ENCOUNTER (OUTPATIENT)
Dept: ULTRASOUND IMAGING | Facility: HOSPITAL | Age: 56
Discharge: HOME | End: 2021-03-09
Admitting: FAMILY MEDICINE

## 2021-03-09 DIAGNOSIS — R10.11 RIGHT UPPER QUADRANT PAIN: ICD-10-CM

## 2021-03-09 PROCEDURE — 76705 ECHO EXAM OF ABDOMEN: CPT

## 2021-03-30 ENCOUNTER — BULK ORDERING (OUTPATIENT)
Dept: CASE MANAGEMENT | Facility: OTHER | Age: 56
End: 2021-03-30

## 2021-03-30 DIAGNOSIS — Z23 IMMUNIZATION DUE: ICD-10-CM

## 2021-05-10 ENCOUNTER — TELEPHONE (OUTPATIENT)
Dept: OBSTETRICS AND GYNECOLOGY | Facility: CLINIC | Age: 56
End: 2021-05-10

## 2021-05-10 NOTE — TELEPHONE ENCOUNTER
Attempted to contact patient about 6 month FU with [facility]. Left general VM to call office.        Patient needs to schedule 6 month FU with [facility] in June. [phone]

## 2021-07-22 ENCOUNTER — APPOINTMENT (OUTPATIENT)
Dept: WOMENS IMAGING | Facility: HOSPITAL | Age: 56
End: 2021-07-22

## 2021-07-22 ENCOUNTER — PROCEDURE VISIT (OUTPATIENT)
Dept: OBSTETRICS AND GYNECOLOGY | Facility: CLINIC | Age: 56
End: 2021-07-22

## 2021-07-22 DIAGNOSIS — Z12.31 VISIT FOR SCREENING MAMMOGRAM: Primary | ICD-10-CM

## 2021-07-22 PROCEDURE — 77067 SCR MAMMO BI INCL CAD: CPT | Performed by: RADIOLOGY

## 2021-07-22 PROCEDURE — 77067 SCR MAMMO BI INCL CAD: CPT | Performed by: OBSTETRICS & GYNECOLOGY

## 2021-07-22 PROCEDURE — 77063 BREAST TOMOSYNTHESIS BI: CPT | Performed by: OBSTETRICS & GYNECOLOGY

## 2021-07-22 PROCEDURE — 77063 BREAST TOMOSYNTHESIS BI: CPT | Performed by: RADIOLOGY

## 2021-10-20 ENCOUNTER — OFFICE VISIT (OUTPATIENT)
Dept: OBSTETRICS AND GYNECOLOGY | Facility: CLINIC | Age: 56
End: 2021-10-20

## 2021-10-20 VITALS
HEIGHT: 66 IN | SYSTOLIC BLOOD PRESSURE: 142 MMHG | BODY MASS INDEX: 21.86 KG/M2 | DIASTOLIC BLOOD PRESSURE: 90 MMHG | WEIGHT: 136 LBS

## 2021-10-20 DIAGNOSIS — Z01.419 ENCOUNTER FOR GYNECOLOGICAL EXAMINATION WITHOUT ABNORMAL FINDING: Primary | ICD-10-CM

## 2021-10-20 DIAGNOSIS — M85.80 OSTEOPENIA, UNSPECIFIED LOCATION: ICD-10-CM

## 2021-10-20 DIAGNOSIS — Z12.39 ENCOUNTER FOR BREAST CANCER SCREENING USING NON-MAMMOGRAM MODALITY: ICD-10-CM

## 2021-10-20 PROCEDURE — 82274 ASSAY TEST FOR BLOOD FECAL: CPT | Performed by: OBSTETRICS & GYNECOLOGY

## 2021-10-20 PROCEDURE — 99396 PREV VISIT EST AGE 40-64: CPT | Performed by: OBSTETRICS & GYNECOLOGY

## 2021-10-20 NOTE — PROGRESS NOTES
Subjective    Chief Complaint   Patient presents with   • Gynecologic Exam     AE      History of Present Illness    Ayana Silveira is a 56 y.o. female who presents for annual exam.  Non-smoker.  Mammogram 2021 normal.  DEXA scan 2020 showed mild osteopenia only.  Colonoscopy .  Due again after 10 years.  Having no bleeding and no problems.    Obstetric History:  OB History        3    Para   3    Term   3            AB        Living           SAB        IAB        Ectopic        Molar        Multiple        Live Births                   Menstrual History:     No LMP recorded (lmp unknown). Patient is postmenopausal.       Past Medical History:   Diagnosis Date   • Muscle strain of upper extremity 3/1/2021   • Peptic ulcer     when she was 18 years old   • Post-menopausal      Family History   Problem Relation Age of Onset   • Breast cancer Maternal Grandmother    • Breast cancer Paternal Grandmother    • Coronary artery disease Mother         stent   • Atrial fibrillation Mother    • Gallbladder disease Mother    • Arthritis Father    • Coronary artery disease Father         s/p stent and cabg    • Hypertension Father    • No Known Problems Sister    • Hyperlipidemia Brother      Social History     Tobacco Use   Smoking Status Former Smoker   • Quit date: 1983   • Years since quittin.8   Smokeless Tobacco Never Used   Tobacco Comment    just light smoker through college.          The following portions of the patient's history were reviewed and updated as appropriate: allergies, current medications, past family history, past medical history, past social history, past surgical history and problem list.    Review of Systems   Constitutional: Negative.  Negative for fever and unexpected weight change.   HENT: Negative.    Respiratory: Negative for shortness of breath and wheezing.    Cardiovascular: Negative for chest pain, palpitations and leg swelling.   Gastrointestinal:  Negative for abdominal pain, anal bleeding and blood in stool.   Genitourinary: Negative for dysuria, pelvic pain, urgency, vaginal bleeding, vaginal discharge and vaginal pain.   Skin: Negative.    Neurological: Negative.    Hematological: Negative.  Negative for adenopathy.   Psychiatric/Behavioral: Negative.  Negative for dysphoric mood. The patient is not nervous/anxious.             Objective   Physical Exam  Exam conducted with a chaperone present.   Constitutional:       Appearance: Normal appearance. She is well-developed.   HENT:      Head: Normocephalic.   Neck:      Thyroid: No thyromegaly.      Trachea: Trachea normal. No tracheal deviation.   Cardiovascular:      Rate and Rhythm: Normal rate and regular rhythm.      Heart sounds: Normal heart sounds. No murmur heard.      Pulmonary:      Effort: Pulmonary effort is normal.      Breath sounds: Normal breath sounds.   Chest:   Breasts:      Right: Normal. No mass, nipple discharge, tenderness or axillary adenopathy.      Left: Normal. No mass, nipple discharge, tenderness or axillary adenopathy.       Abdominal:      Palpations: Abdomen is soft. There is no mass.      Tenderness: There is no abdominal tenderness.      Hernia: No hernia is present.   Genitourinary:     General: Normal vulva.      Labia:         Right: No tenderness or lesion.         Left: No tenderness or lesion.       Urethra: No prolapse or urethral lesion.      Vagina: Normal. No vaginal discharge or lesions.      Cervix: No cervical motion tenderness.      Uterus: Not enlarged and not tender.       Adnexa:         Right: No mass or tenderness.          Left: No mass or tenderness.        Rectum: Normal. Guaiac result negative. No external hemorrhoid or internal hemorrhoid. Normal anal tone.      Comments: External genitalia normal   Lymphadenopathy:      Cervical: No cervical adenopathy.      Upper Body:      Right upper body: No axillary adenopathy.      Left upper body: No axillary  "adenopathy.   Skin:     General: Skin is warm and dry.      Findings: No rash.   Neurological:      Mental Status: She is alert and oriented to person, place, and time.   Psychiatric:         Behavior: Behavior normal.         /90   Ht 167.6 cm (66\")   Wt 61.7 kg (136 lb)   LMP  (LMP Unknown)   BMI 21.95 kg/m²     Assessment/Plan   Diagnoses and all orders for this visit:    1. Encounter for gynecological examination without abnormal finding (Primary)  -     IGP,rfx Aptima HPV All Pth  -     POC Occult Blood Stool    2. Encounter for breast cancer screening using non-mammogram modality    3. Osteopenia, unspecified location        Impression and plan.  Return to office 1 year.  Counseled about beginning calcium with vitamin D for osteoporosis prevention.  Counseled about colorectal screening.             "

## 2022-02-12 ENCOUNTER — APPOINTMENT (OUTPATIENT)
Dept: GENERAL RADIOLOGY | Facility: HOSPITAL | Age: 57
End: 2022-02-12

## 2022-02-12 ENCOUNTER — HOSPITAL ENCOUNTER (EMERGENCY)
Facility: HOSPITAL | Age: 57
Discharge: HOME | End: 2022-02-12
Attending: EMERGENCY MEDICINE | Admitting: EMERGENCY MEDICINE

## 2022-02-12 VITALS
DIASTOLIC BLOOD PRESSURE: 80 MMHG | WEIGHT: 140 LBS | BODY MASS INDEX: 22.5 KG/M2 | TEMPERATURE: 96.9 F | HEIGHT: 66 IN | RESPIRATION RATE: 17 BRPM | SYSTOLIC BLOOD PRESSURE: 130 MMHG | OXYGEN SATURATION: 99 % | HEART RATE: 64 BPM

## 2022-02-12 DIAGNOSIS — S29.011A PECTORALIS MUSCLE STRAIN, INITIAL ENCOUNTER: Primary | ICD-10-CM

## 2022-02-12 DIAGNOSIS — R07.89 ATYPICAL CHEST PAIN: ICD-10-CM

## 2022-02-12 LAB
ALBUMIN SERPL-MCNC: 4.5 G/DL (ref 3.5–5.2)
ALBUMIN/GLOB SERPL: 1.7 G/DL
ALP SERPL-CCNC: 42 U/L (ref 39–117)
ALT SERPL W P-5'-P-CCNC: 24 U/L (ref 1–33)
ANION GAP SERPL CALCULATED.3IONS-SCNC: 12.7 MMOL/L (ref 5–15)
AST SERPL-CCNC: 20 U/L (ref 1–32)
BASOPHILS # BLD AUTO: 0.05 10*3/MM3 (ref 0–0.2)
BASOPHILS NFR BLD AUTO: 0.7 % (ref 0–1.5)
BILIRUB SERPL-MCNC: 0.5 MG/DL (ref 0–1.2)
BUN SERPL-MCNC: 10 MG/DL (ref 6–20)
BUN/CREAT SERPL: 12.3 (ref 7–25)
CALCIUM SPEC-SCNC: 9.3 MG/DL (ref 8.6–10.5)
CHLORIDE SERPL-SCNC: 102 MMOL/L (ref 98–107)
CO2 SERPL-SCNC: 27.3 MMOL/L (ref 22–29)
CREAT SERPL-MCNC: 0.81 MG/DL (ref 0.57–1)
DEPRECATED RDW RBC AUTO: 44.1 FL (ref 37–54)
EOSINOPHIL # BLD AUTO: 0.07 10*3/MM3 (ref 0–0.4)
EOSINOPHIL NFR BLD AUTO: 1 % (ref 0.3–6.2)
ERYTHROCYTE [DISTWIDTH] IN BLOOD BY AUTOMATED COUNT: 12.2 % (ref 12.3–15.4)
GFR SERPL CREATININE-BSD FRML MDRD: 73 ML/MIN/1.73
GLOBULIN UR ELPH-MCNC: 2.6 GM/DL
GLUCOSE SERPL-MCNC: 109 MG/DL (ref 65–99)
HCT VFR BLD AUTO: 37.1 % (ref 34–46.6)
HGB BLD-MCNC: 13.5 G/DL (ref 12–15.9)
IMM GRANULOCYTES # BLD AUTO: 0.03 10*3/MM3 (ref 0–0.05)
IMM GRANULOCYTES NFR BLD AUTO: 0.4 % (ref 0–0.5)
LYMPHOCYTES # BLD AUTO: 1.86 10*3/MM3 (ref 0.7–3.1)
LYMPHOCYTES NFR BLD AUTO: 25.7 % (ref 19.6–45.3)
MCH RBC QN AUTO: 36.3 PG (ref 26.6–33)
MCHC RBC AUTO-ENTMCNC: 36.4 G/DL (ref 31.5–35.7)
MCV RBC AUTO: 99.7 FL (ref 79–97)
MONOCYTES # BLD AUTO: 0.62 10*3/MM3 (ref 0.1–0.9)
MONOCYTES NFR BLD AUTO: 8.6 % (ref 5–12)
NEUTROPHILS NFR BLD AUTO: 4.61 10*3/MM3 (ref 1.7–7)
NEUTROPHILS NFR BLD AUTO: 63.6 % (ref 42.7–76)
NRBC BLD AUTO-RTO: 0.1 /100 WBC (ref 0–0.2)
PLATELET # BLD AUTO: 241 10*3/MM3 (ref 140–450)
PMV BLD AUTO: 9.7 FL (ref 6–12)
POTASSIUM SERPL-SCNC: 4 MMOL/L (ref 3.5–5.2)
PROT SERPL-MCNC: 7.1 G/DL (ref 6–8.5)
QT INTERVAL: 389 MS
RBC # BLD AUTO: 3.72 10*6/MM3 (ref 3.77–5.28)
SODIUM SERPL-SCNC: 142 MMOL/L (ref 136–145)
TROPONIN T SERPL-MCNC: <0.01 NG/ML (ref 0–0.03)
WBC NRBC COR # BLD: 7.24 10*3/MM3 (ref 3.4–10.8)

## 2022-02-12 PROCEDURE — 93005 ELECTROCARDIOGRAM TRACING: CPT

## 2022-02-12 PROCEDURE — 99284 EMERGENCY DEPT VISIT MOD MDM: CPT

## 2022-02-12 PROCEDURE — 85025 COMPLETE CBC W/AUTO DIFF WBC: CPT | Performed by: EMERGENCY MEDICINE

## 2022-02-12 PROCEDURE — 84484 ASSAY OF TROPONIN QUANT: CPT | Performed by: EMERGENCY MEDICINE

## 2022-02-12 PROCEDURE — 93010 ELECTROCARDIOGRAM REPORT: CPT | Performed by: INTERNAL MEDICINE

## 2022-02-12 PROCEDURE — 80053 COMPREHEN METABOLIC PANEL: CPT | Performed by: EMERGENCY MEDICINE

## 2022-02-12 PROCEDURE — 71045 X-RAY EXAM CHEST 1 VIEW: CPT

## 2022-02-12 RX ORDER — SODIUM CHLORIDE 0.9 % (FLUSH) 0.9 %
10 SYRINGE (ML) INJECTION AS NEEDED
Status: DISCONTINUED | OUTPATIENT
Start: 2022-02-12 | End: 2022-02-12 | Stop reason: HOSPADM

## 2022-02-12 RX ORDER — KETOROLAC TROMETHAMINE 15 MG/ML
15 INJECTION, SOLUTION INTRAMUSCULAR; INTRAVENOUS ONCE
Status: DISCONTINUED | OUTPATIENT
Start: 2022-02-12 | End: 2022-02-12 | Stop reason: HOSPADM

## 2022-02-12 NOTE — ED PROVIDER NOTES
EMERGENCY DEPARTMENT ENCOUNTER  I wore full protective equipment throughout this patient encounter including a N95 mask, eye shield, gown and gloves. Hand hygiene was performed before donning protective equipment and after removal when leaving the room.    Room Number:  24/24  Date of encounter:  2/12/2022  PCP: Shereen Forbes MD    HPI:  Context: Ayana Silveira is a 56 y.o. female who presents to the ED c/o chief complaint of intermittent substernal chest pain radiating to both breasts for the past 5 days.  Patient states that she lifted firewood 6 days ago.  The pain began the next day and last for a few minutes at a time.  She states that using a heating pad seems to help with the pain.  The pain is not worse with exertion.  She states she has performed her usual exercise routine without worsening pain.  She denies associated shortness of air, nausea, vomiting or diaphoresis.  She denies leg pain or leg swelling.  She did go to an urgent care yesterday and was diagnosed with chest wall strain.  She was given naproxen, muscle relaxant and lidocaine patches.  She states the naproxen did not help her pain but the lidocaine patches did.  She denies rash, fevers or chills.  She does not smoke but she does drink at least 2 drinks a day.  There is a positive family history of heart disease in both her parents have had heart disease.    MEDICAL HISTORY REVIEW  Reviewed in [facility].  Patient was seen at an urgent care yesterday and her chest pain was felt to be musculoskeletal in nature.    PAST MEDICAL HISTORY  Active Ambulatory Problems     Diagnosis Date Noted   • HLD (hyperlipidemia) 03/01/2021     Resolved Ambulatory Problems     Diagnosis Date Noted   • Muscle strain of upper extremity 03/01/2021     Past Medical History:   Diagnosis Date   • Peptic ulcer    • Post-menopausal        PAST SURGICAL HISTORY  Past Surgical History:   Procedure Laterality Date   • COLONOSCOPY  2014   • ORIF ULNAR / RADIAL SHAFT FRACTURE      • TONSILLECTOMY         FAMILY HISTORY  Family History   Problem Relation Age of Onset   • Breast cancer Maternal Grandmother    • Breast cancer Paternal Grandmother    • Coronary artery disease Mother         stent   • Atrial fibrillation Mother    • Gallbladder disease Mother    • Arthritis Father    • Coronary artery disease Father         s/p stent and cabg    • Hypertension Father    • No Known Problems Sister    • Hyperlipidemia Brother        SOCIAL HISTORY  Social History     Socioeconomic History   • Marital status:      Spouse name: Omid Dewey   • Number of children: 3   Tobacco Use   • Smoking status: Former Smoker     Quit date: 1983     Years since quittin.1   • Smokeless tobacco: Never Used   • Tobacco comment: just light smoker through college.    Substance and Sexual Activity   • Alcohol use: Yes     Comment: cocktail and glass of wine nightly weekdays, weekends more   • Drug use: Never   • Sexual activity: Yes     Partners: Male     Birth control/protection: Post-menopausal       ALLERGIES  Sulfa antibiotics    The patient's allergies have been reviewed    REVIEW OF SYSTEMS  All systems reviewed and negative except for those discussed in HPI.     PHYSICAL EXAM  I have reviewed the triage vital signs and nursing notes.  ED Triage Vitals   Temp Heart Rate Resp BP SpO2   22 1243 22 1243 22 1243 22 1309 22 1243   96.9 °F (36.1 °C) 105 18 149/89 99 %      Temp src Heart Rate Source Patient Position BP Location FiO2 (%)   22 1243 22 1243 -- -- --   Tympanic Monitor            General: Mild distress.  HENT: NCAT, PERRL, Nares patent.  Eyes: no scleral icterus.  Neck: trachea midline, no ROM limitations.  No lymphadenopathy  CV: regular rhythm, regular rate.  Respiratory: normal effort, CTAB.  Patient's pain is easily reproducible with palpation of left pectoralis muscle.  Her pain is worsened with moving her arm against resistance.  Abdomen:  soft, nondistended, NTTP, no rebound tenderness, no guarding or rigidity.  Musculoskeletal: no deformity.  No edema or calf tenderness.  Neuro: alert, moves all extremities, follows commands.  Skin: warm, dry.    LAB RESULTS  Recent Results (from the past 24 hour(s))   ECG 12 Lead    Collection Time: 02/12/22 12:58 PM   Result Value Ref Range    QT Interval 389 ms   Comprehensive Metabolic Panel    Collection Time: 02/12/22  1:13 PM    Specimen: Blood   Result Value Ref Range    Glucose 109 (H) 65 - 99 mg/dL    BUN 10 6 - 20 mg/dL    Creatinine 0.81 0.57 - 1.00 mg/dL    Sodium 142 136 - 145 mmol/L    Potassium 4.0 3.5 - 5.2 mmol/L    Chloride 102 98 - 107 mmol/L    CO2 27.3 22.0 - 29.0 mmol/L    Calcium 9.3 8.6 - 10.5 mg/dL    Total Protein 7.1 6.0 - 8.5 g/dL    Albumin 4.50 3.50 - 5.20 g/dL    ALT (SGPT) 24 1 - 33 U/L    AST (SGOT) 20 1 - 32 U/L    Alkaline Phosphatase 42 39 - 117 U/L    Total Bilirubin 0.5 0.0 - 1.2 mg/dL    eGFR Non African Amer 73 >60 mL/min/1.73    Globulin 2.6 gm/dL    A/G Ratio 1.7 g/dL    BUN/Creatinine Ratio 12.3 7.0 - 25.0    Anion Gap 12.7 5.0 - 15.0 mmol/L   Troponin    Collection Time: 02/12/22  1:13 PM    Specimen: Blood   Result Value Ref Range    Troponin T <0.010 0.000 - 0.030 ng/mL   CBC Auto Differential    Collection Time: 02/12/22  1:13 PM    Specimen: Blood   Result Value Ref Range    WBC 7.24 3.40 - 10.80 10*3/mm3    RBC 3.72 (L) 3.77 - 5.28 10*6/mm3    Hemoglobin 13.5 12.0 - 15.9 g/dL    Hematocrit 37.1 34.0 - 46.6 %    MCV 99.7 (H) 79.0 - 97.0 fL    MCH 36.3 (H) 26.6 - 33.0 pg    MCHC 36.4 (H) 31.5 - 35.7 g/dL    RDW 12.2 (L) 12.3 - 15.4 %    RDW-SD 44.1 37.0 - 54.0 fl    MPV 9.7 6.0 - 12.0 fL    Platelets 241 140 - 450 10*3/mm3    Neutrophil % 63.6 42.7 - 76.0 %    Lymphocyte % 25.7 19.6 - 45.3 %    Monocyte % 8.6 5.0 - 12.0 %    Eosinophil % 1.0 0.3 - 6.2 %    Basophil % 0.7 0.0 - 1.5 %    Immature Grans % 0.4 0.0 - 0.5 %    Neutrophils, Absolute 4.61 1.70 - 7.00 10*3/mm3     Lymphocytes, Absolute 1.86 0.70 - 3.10 10*3/mm3    Monocytes, Absolute 0.62 0.10 - 0.90 10*3/mm3    Eosinophils, Absolute 0.07 0.00 - 0.40 10*3/mm3    Basophils, Absolute 0.05 0.00 - 0.20 10*3/mm3    Immature Grans, Absolute 0.03 0.00 - 0.05 10*3/mm3    nRBC 0.1 0.0 - 0.2 /100 WBC       I ordered the above labs and reviewed the results.    RADIOLOGY  XR Chest 1 View    Result Date: 2/12/2022  XR CHEST 1 VW-  HISTORY: Female who is 56 years-old,  chest pain  TECHNIQUE: Frontal view of the chest  COMPARISON: None available  FINDINGS: Heart, mediastinum and pulmonary vasculature are unremarkable. No focal pulmonary consolidation, pleural effusion, or pneumothorax. No acute osseous process.      No evidence for acute pulmonary process. Follow-up as clinical indications persist.  This report was finalized on 2/12/2022 2:03 PM by Dr. Parminder Benavidez M.D.        I ordered the above noted radiological studies. I reviewed the images and results. I agree with the radiologist interpretation.    PROCEDURES  Procedures  EKG          EKG time: 1258  Rhythm/Rate: Normal sinus rhythm, rate 77  P waves and MS: normal  QRS, axis: normal   ST and T waves: normal     Interpreted Contemporaneously by me, independently viewed  No previous EKG    HEART SCORE    History Slightly or non-suspicious (0)  ECG Normal (0)  Age 46-65 (1)  Risk factors 1 or 2 (1)  Troponin < or = Normal limit (0)    This patient's HEART score is 2    HEART Score Key:  Scores 0-3: 0.9-1.7% risk of adverse cardiac event. In the HEART Score study, these patients were discharged (0.99% in the retrospective study, 1.7% in the prospective study)  Scores 4-6: 12-16.6% risk of adverse cardiac event. In the HEART Score study, these patients were admitted to the hospital. (11.6% retrospective, 16.6% prospective)  Scores ?7: 50-65% risk of adverse cardiac event. In the HEART Score study, these patients were candidates for early invasive measures. (65.2% retrospective,  50.1% prospective)        MEDICATIONS GIVEN IN ER  Medications   sodium chloride 0.9 % flush 10 mL (has no administration in time range)   ketorolac (TORADOL) injection 15 mg (15 mg Intravenous Not Given 2/12/22 1434)       PROGRESS, DATA ANALYSIS, CONSULTS, AND MEDICAL DECISION MAKING  A complete history and physical exam have been performed.  All available laboratory and imaging results have been reviewed by myself prior to disposition.    MDM  After the initial H&P, I discussed pertinent information from history and physical exam with patient/family.  Discussed differential diagnosis.  Discussed plan for ED evaluation/workup/treatment.  All questions answered.  Patient/family is agreeable with plan.  ED Course as of 02/12/22 1539   Sat Feb 12, 2022   1310 Patient presents with reproducible chest wall pain.  She is concerned that there may be something else going on.  We will obtain basic blood work including troponin and a chest x-ray.  I suspect that patient does have chest wall strain.  Her pain was relieved with lidocaine patches that the urgent care prescribed to her. [DE]   1420 I updated patient on the results of her chest x-ray and blood work.  Patient states she does have lidocaine patches at home.  I think it is safe to discharge patient to home.  I gave her return to ER instructions including chest pain is worse with exertion, sudden sweating or shortness of breath. [DE]      ED Course User Index  [DE] Woody Rabago MD       AS OF 15:39 EST VITALS:    BP - 130/80  HR - 64  TEMP - 96.9 °F (36.1 °C) (Tympanic)  O2 SATS - 99%    DIAGNOSIS  Final diagnoses:   Pectoralis muscle strain, initial encounter   Atypical chest pain         DISPOSITION    DISCHARGE    Patient discharged in stable condition.    Reviewed implications of results, diagnosis, meds, responsibility to follow up, warning signs and symptoms of possible worsening, potential complications and reasons to return to ER.    Patient/Family  voiced understanding of above instructions.    Discussed plan for discharge, as there is no emergent indication for admission. Patient referred to primary care provider for BP management due to today's BP. Pt/family is agreeable and understands need for follow up and repeat testing.  Pt is aware that discharge does not mean that nothing is wrong but it indicates no emergency is present that requires admission and they must continue care with follow-up as given below or physician of their choice.     FOLLOW-UP  Shereen Forbes MD  [facility]  [place] [id number] [phone]    Schedule an appointment as soon as possible for a visit            Medication List      No changes were made to your prescriptions during this visit.          Woody Rabago MD  02/12/22 4798

## 2022-02-12 NOTE — DISCHARGE INSTRUCTIONS
If the naproxen is upsetting her stomach, you can use over-the-counter Tylenol.  Continue using the lidocaine patches as directed.  Continue applying warm compresses to your chest wall muscles for up to 20 minutes at a time.  Please return to the emergency department if symptoms worsen with trouble breathing or chest pain with exertion, sudden sweating or dizziness.  Otherwise, follow-up with your family doctor.

## 2022-02-12 NOTE — ED TRIAGE NOTES
Patient to ed from home with complaints of midsternal CP that does not radiate that started Sunday after carrying firewood. States heat application helps. Patient seen at urgent care yesterday for same.

## 2022-02-18 ENCOUNTER — TELEPHONE (OUTPATIENT)
Dept: FAMILY MEDICINE CLINIC | Facility: CLINIC | Age: 57
End: 2022-02-18

## 2022-02-18 NOTE — TELEPHONE ENCOUNTER
Caller: Ayana Silveira    Relationship: Self    Best call back number: [phone]    What was the call regarding: PATIENT IS HAVING A BURNING FEELING IN THE SKIN ACROSS HER CHEST. PATIENT STATED THE FEELING STARTED ON THE LEFT SIDE AND OVER 4-5 DAYS IT MOVED ACROSS THE WHOLE CHEST. PATIENT STATED SHE DOES NOT HAVE A SORE THROAT BUT HAS THAT SAME FEELING IN THE GLANDS IN HER THROAT. PATIENT STATED IT FEELS LIKE A CONSTANT THROBBING. PATIENT STATED A HEATING PAD HELPS OR IF SHE RUBS OR MASSAGES HER CHEST. PATIENT DID GO TO IMMEDIATE CARE ON Friday, 02/11/2022 AND WENT TO THE ER AT [facility] ON Saturday, 02/12/2022. PATIENT HAD A CHEST XRAY DONE, AND NOTHING WAS FOUND. PATIENT STATED THEY DID NOT FIND ANYTHING WRONG WITH HER HEART.  PATIENT STATED SHE DOES NOT FEEL CONGESTED AND IS NOT COUGHING. PATIENT IS SCHEDULED FOR AN APPOINTMENT ON Monday 02/21/2022, BUT WOULD LIKE TO BE ADVISED ON WHAT SHE SHOULD DO BETWEEN NOW AND THE APPOINTMENT.      ATTEMPTED WARM TRANSFER, UNSUCCESSFUL.     Do you require a callback: YES

## 2022-02-18 NOTE — TELEPHONE ENCOUNTER
Left this patient a detailed VM from Leidy's orders. She advised her to use heating pad and do stretching. Alternate tylenol 1000 mg Q 8 hrs with -600 mg Q8 hrs. Told her to make sure she is taking something Q4 hours. Hydrate well with water. Left message if she needed anything else or didn't understanding instructions then to call  the office.    Pt called office back short while later. This nurse reviewed Leidy's message again with patient. Pt advises that she can not take IBU. Spoke with eLidy on this and she was going to send a low dose muscle relaxer. Advised to take at night and may cause her to be sleepy. Pt states she was given a muscle relaxer when she went to . Has not taken it yet. She does not like the way it makes her feel. She states she may try it to call the pain down. Advised leidy on this matter.

## 2022-02-21 ENCOUNTER — OFFICE VISIT (OUTPATIENT)
Dept: FAMILY MEDICINE CLINIC | Facility: CLINIC | Age: 57
End: 2022-02-21

## 2022-02-21 VITALS
OXYGEN SATURATION: 98 % | BODY MASS INDEX: 22.84 KG/M2 | WEIGHT: 142.1 LBS | SYSTOLIC BLOOD PRESSURE: 157 MMHG | HEIGHT: 66 IN | TEMPERATURE: 96.8 F | HEART RATE: 75 BPM | DIASTOLIC BLOOD PRESSURE: 90 MMHG

## 2022-02-21 DIAGNOSIS — R07.89 ATYPICAL CHEST PAIN: Primary | ICD-10-CM

## 2022-02-21 PROCEDURE — 99213 OFFICE O/P EST LOW 20 MIN: CPT | Performed by: NURSE PRACTITIONER

## 2022-02-21 NOTE — PROGRESS NOTES
"Chief Complaint  chest soreness (c/o chest soreness, went to ER a week ago and still having issues)    Subjective          Ayana Silveira presents to [facility]  History of Present Illness Pt is here for 2 weeks of pain across her chest that radiates to her left arm with certain mvmt.    Became concerned about it since it was not improving and went to UC.  Not thought to be cardiac and was given naproxen and muscle relaxer.  Did not take muscle relaxer.  Took one dose of naproxen without improvement and so went to ER next day.    There she had a negative w/u.  Thought to be muscular.  Has been taking tylenol sporadically with little improvement.   Had been lifting a lot of firewood prior to onset.  She is normally physically active.    No associated cough, dyspnea.     Objective   Vital Signs:   /90   Pulse 75   Temp 96.8 °F (36 °C)   Ht 167.6 cm (66\")   Wt 64.5 kg (142 lb 1.6 oz)   SpO2 98%   BMI 22.94 kg/m²     Physical Exam  Vitals and nursing note reviewed.   Constitutional:       General: She is not in acute distress.     Appearance: She is well-developed. She is not ill-appearing or diaphoretic.   HENT:      Head: Normocephalic and atraumatic.   Eyes:      General:         Right eye: No discharge.         Left eye: No discharge.      Conjunctiva/sclera: Conjunctivae normal.   Cardiovascular:      Rate and Rhythm: Normal rate and regular rhythm.      Heart sounds: Normal heart sounds.   Pulmonary:      Effort: Pulmonary effort is normal.      Breath sounds: Normal breath sounds.   Chest:      Chest wall: No mass, deformity or crepitus.   Breasts:      Right: No swelling, nipple discharge, skin change, tenderness, axillary adenopathy or supraclavicular adenopathy.      Left: No swelling, nipple discharge, skin change, tenderness, axillary adenopathy or supraclavicular adenopathy.       Abdominal:      General: Bowel sounds are normal.      Palpations: Abdomen is soft. "      Tenderness: There is no abdominal tenderness.   Musculoskeletal:         General: No deformity.      Cervical back: Neck supple.      Comments: No obvious rib deformities, no crepitus, point tenderness  Has some mild diffuse sternal discomfort with deep palpation along costal junction b/l   Lymphadenopathy:      Cervical: No cervical adenopathy.      Upper Body:      Right upper body: No supraclavicular, axillary or pectoral adenopathy.      Left upper body: No supraclavicular, axillary or pectoral adenopathy.   Skin:     General: Skin is warm and dry.   Neurological:      General: No focal deficit present.      Mental Status: She is alert and oriented to person, place, and time.   Psychiatric:         Mood and Affect: Mood normal.         Behavior: Behavior normal.      Comments: Very pleasant and conversant        Result Review :                 Assessment and Plan    Diagnoses and all orders for this visit:    1. Atypical chest pain (Primary)    Reviewed both UC and ER w/u which were negative for cardiac etiology.  I dont think this is costochondritis based on exam.  Seems to be more superficial.  I suspect it is some inflammation of breast ligaments.  With discussion we realized she was quite active with upper chest, lifting wood and stacking and was not using compression bra.  Normally uses compression bra during activity.  Tylenol, rest, expected course and duration.  If not improving RTC.                 Follow Up   No follow-ups on file.  Patient was given instructions and counseling regarding her condition or for health maintenance advice. Please see specific information pulled into the AVS if appropriate.

## 2022-03-18 ENCOUNTER — OFFICE VISIT (OUTPATIENT)
Dept: FAMILY MEDICINE CLINIC | Facility: CLINIC | Age: 57
End: 2022-03-18

## 2022-03-18 VITALS
DIASTOLIC BLOOD PRESSURE: 76 MMHG | HEART RATE: 95 BPM | HEIGHT: 66 IN | RESPIRATION RATE: 17 BRPM | SYSTOLIC BLOOD PRESSURE: 132 MMHG | TEMPERATURE: 98.2 F | WEIGHT: 135.9 LBS | OXYGEN SATURATION: 95 % | BODY MASS INDEX: 21.84 KG/M2

## 2022-03-18 DIAGNOSIS — R20.8 BURNING SENSATION OF SKIN: ICD-10-CM

## 2022-03-18 DIAGNOSIS — E78.5 HYPERLIPIDEMIA, UNSPECIFIED HYPERLIPIDEMIA TYPE: ICD-10-CM

## 2022-03-18 DIAGNOSIS — N64.4 PAIN OF BOTH BREASTS: ICD-10-CM

## 2022-03-18 DIAGNOSIS — Z00.00 ANNUAL PHYSICAL EXAM: Primary | ICD-10-CM

## 2022-03-18 PROCEDURE — 99396 PREV VISIT EST AGE 40-64: CPT | Performed by: FAMILY MEDICINE

## 2022-03-19 LAB
CHOLEST SERPL-MCNC: 361 MG/DL (ref 100–199)
HDLC SERPL-MCNC: 112 MG/DL
LDLC SERPL CALC-MCNC: 223 MG/DL (ref 0–99)
TRIGL SERPL-MCNC: 149 MG/DL (ref 0–149)
TSH SERPL DL<=0.005 MIU/L-ACNC: 1.29 UIU/ML (ref 0.45–4.5)
VLDLC SERPL CALC-MCNC: 26 MG/DL (ref 5–40)

## 2022-03-29 RX ORDER — ROSUVASTATIN CALCIUM 10 MG/1
10 TABLET, COATED ORAL NIGHTLY
Qty: 90 TABLET | Refills: 3 | Status: SHIPPED | OUTPATIENT
Start: 2022-03-29

## 2022-04-20 ENCOUNTER — HOSPITAL ENCOUNTER (OUTPATIENT)
Dept: ULTRASOUND IMAGING | Facility: HOSPITAL | Age: 57
Discharge: HOME | End: 2022-04-20

## 2022-04-20 ENCOUNTER — HOSPITAL ENCOUNTER (OUTPATIENT)
Dept: MAMMOGRAPHY | Facility: HOSPITAL | Age: 57
Discharge: HOME | End: 2022-04-20

## 2022-04-20 DIAGNOSIS — N64.4 PAIN OF BOTH BREASTS: ICD-10-CM

## 2022-04-20 PROCEDURE — 77066 DX MAMMO INCL CAD BI: CPT

## 2022-04-20 PROCEDURE — 76642 ULTRASOUND BREAST LIMITED: CPT

## 2022-04-20 PROCEDURE — G0279 TOMOSYNTHESIS, MAMMO: HCPCS

## 2022-06-09 ENCOUNTER — OFFICE VISIT (OUTPATIENT)
Dept: FAMILY MEDICINE CLINIC | Facility: CLINIC | Age: 57
End: 2022-06-09

## 2022-06-09 VITALS
DIASTOLIC BLOOD PRESSURE: 82 MMHG | SYSTOLIC BLOOD PRESSURE: 148 MMHG | TEMPERATURE: 97.1 F | OXYGEN SATURATION: 95 % | BODY MASS INDEX: 21.79 KG/M2 | WEIGHT: 135 LBS | HEART RATE: 75 BPM

## 2022-06-09 DIAGNOSIS — F41.0 PANIC ATTACKS: Primary | ICD-10-CM

## 2022-06-09 PROCEDURE — 99213 OFFICE O/P EST LOW 20 MIN: CPT | Performed by: NURSE PRACTITIONER

## 2022-06-09 RX ORDER — HYDROXYZINE 50 MG/1
50 TABLET, FILM COATED ORAL EVERY 6 HOURS PRN
COMMUNITY
Start: 2022-05-31

## 2022-06-09 RX ORDER — ONDANSETRON 4 MG/1
4 TABLET, ORALLY DISINTEGRATING ORAL EVERY 8 HOURS PRN
COMMUNITY
Start: 2022-05-31 | End: 2022-06-09 | Stop reason: SDUPTHER

## 2022-06-09 RX ORDER — ONDANSETRON 4 MG/1
4 TABLET, ORALLY DISINTEGRATING ORAL
COMMUNITY
Start: 2022-05-31

## 2022-06-09 NOTE — PROGRESS NOTES
"Chief Complaint  Anxiety (Pt anxiety getting worse)    Subjective        Ayana Silveira presents to [facility]  History of Present Illness patient known to me for previous acute visits.  Today she is here for panic attacks.  She has always had some mild panic attacks that she is able to manage herself.  Over the last 3 months they have seem to worsen and she recently had to go to the urgent care for a panic attack.  She has had a lot of family stressors recently.  She prefers to avoid medication and see if she is able to learn to manage anxiety.  She was given a prescription for hydroxyzine that she had not been able to  until recently and has not used.  She may feel little less anxious knowing she has a prescription for as needed use.      Objective   Vital Signs:  /82   Pulse 75   Temp 97.1 °F (36.2 °C)   Wt 61.2 kg (135 lb)   SpO2 95%   BMI 21.79 kg/m²   Estimated body mass index is 21.79 kg/m² as calculated from the following:    Height as of 3/18/22: 167.6 cm (66\").    Weight as of this encounter: 61.2 kg (135 lb).    BMI is within normal parameters. No other follow-up for BMI required.      Physical Exam  Vitals and nursing note reviewed.   Constitutional:       General: She is not in acute distress.     Appearance: She is well-developed. She is not diaphoretic.   HENT:      Head: Normocephalic and atraumatic.   Eyes:      General:         Right eye: No discharge.         Left eye: No discharge.      Conjunctiva/sclera: Conjunctivae normal.   Pulmonary:      Effort: Pulmonary effort is normal.   Musculoskeletal:         General: No deformity.      Comments: Gait smooth and steady   Skin:     General: Skin is warm and dry.   Neurological:      General: No focal deficit present.      Mental Status: She is alert and oriented to person, place, and time.   Psychiatric:         Attention and Perception: Attention and perception normal.         Mood and Affect: Mood " and affect normal.         Speech: Speech normal.         Behavior: Behavior normal. Behavior is cooperative.         Thought Content: Thought content normal.         Cognition and Memory: Cognition normal.      Comments: Well dressed, seems open, forthcoming, pleasant, conversant with smooth speech        Result Review :                Assessment and Plan   Diagnoses and all orders for this visit:    1. Panic attacks (Primary)  -     Ambulatory Referral to Behavioral Health    Patient does not want to be on a daily medication for anxiety.  She would like to try counseling however.  I have referred her to behavioral health and they should be contacting her for assistance with managing her anxiety and panic.  She has a as needed prescription for hydroxyzine.  We discussed side effects, and MOA.  She could take a half a dose as needed as it would likely cause drowsiness.  Discussed other medication options if hydroxyzine not helpful.  Many times if someone has access to as needed medication they are not able to manage panic attacks without taking medication.  If no better or worsening will need follow-up with PCP.         Follow Up   Return if symptoms worsen or fail to improve.  Patient was given instructions and counseling regarding her condition or for health maintenance advice. Please see specific information pulled into the AVS if appropriate.

## 2022-07-05 NOTE — TELEPHONE ENCOUNTER
Rx Refill Note  Requested Prescriptions     Pending Prescriptions Disp Refills   • omeprazole (priLOSEC) 40 MG capsule [Pharmacy Med Name: OMEPRAZOLE DR 40 MG CAPSULE] 90 capsule 0     Sig: TAKE 1 CAPSULE BY MOUTH EVERY DAY      Last office visit with prescribing clinician: 3/18/2022      Next office visit with prescribing clinician: 3/27/2023            Taylor Adame MA  07/05/22, 09:10 EDT

## 2022-07-05 NOTE — TELEPHONE ENCOUNTER
Rx Refill Note  Requested Prescriptions     Pending Prescriptions Disp Refills   • omeprazole (priLOSEC) 40 MG capsule 90 capsule 0     Sig: Take 1 capsule by mouth Daily for 90 days.      Last office visit with prescribing clinician: 3/18/2022      Next office visit with prescribing clinician: 7/3/2022            Zaynab Florez MA  07/05/22, 08:43 EDT

## 2022-07-06 RX ORDER — OMEPRAZOLE 40 MG/1
40 CAPSULE, DELAYED RELEASE ORAL DAILY
Qty: 90 CAPSULE | Refills: 1 | Status: SHIPPED | OUTPATIENT
Start: 2022-07-06 | End: 2022-07-06 | Stop reason: SDUPTHER

## 2022-07-06 RX ORDER — OMEPRAZOLE 40 MG/1
CAPSULE, DELAYED RELEASE ORAL
Qty: 90 CAPSULE | Refills: 0 | Status: SHIPPED | OUTPATIENT
Start: 2022-07-06 | End: 2022-10-15 | Stop reason: SDUPTHER

## 2022-10-17 RX ORDER — OMEPRAZOLE 40 MG/1
40 CAPSULE, DELAYED RELEASE ORAL DAILY
Qty: 90 CAPSULE | Refills: 1 | Status: SHIPPED | OUTPATIENT
Start: 2022-10-17

## 2022-10-17 NOTE — TELEPHONE ENCOUNTER
Rx Refill Note  Requested Prescriptions     Pending Prescriptions Disp Refills   • omeprazole (priLOSEC) 40 MG capsule 90 capsule 0     Sig: Take 1 capsule by mouth Daily.      Last office visit with prescribing clinician: 3/18/2022      Next office visit with prescribing clinician: 3/27/2023       {TIP  Please add Last Relevant Lab Date if appropriate: 03/18/22    Yasmine Green MA  10/17/22, 13:10 EDT

## 2022-11-03 ENCOUNTER — OFFICE VISIT (OUTPATIENT)
Dept: OBSTETRICS AND GYNECOLOGY | Facility: CLINIC | Age: 57
End: 2022-11-03

## 2022-11-03 VITALS
DIASTOLIC BLOOD PRESSURE: 82 MMHG | SYSTOLIC BLOOD PRESSURE: 129 MMHG | BODY MASS INDEX: 21.21 KG/M2 | WEIGHT: 132 LBS | HEIGHT: 66 IN

## 2022-11-03 DIAGNOSIS — M85.80 OSTEOPENIA, UNSPECIFIED LOCATION: ICD-10-CM

## 2022-11-03 DIAGNOSIS — Z01.419 ENCOUNTER FOR GYNECOLOGICAL EXAMINATION WITHOUT ABNORMAL FINDING: Primary | ICD-10-CM

## 2022-11-03 DIAGNOSIS — Z12.39 ENCOUNTER FOR BREAST CANCER SCREENING USING NON-MAMMOGRAM MODALITY: ICD-10-CM

## 2022-11-03 PROCEDURE — 99396 PREV VISIT EST AGE 40-64: CPT | Performed by: OBSTETRICS & GYNECOLOGY

## 2022-11-03 NOTE — PROGRESS NOTES
Subjective    Chief Complaint   Patient presents with   • Gynecologic Exam     AE      History of Present Illness    Ayana Silveira is a 57 y.o. female who presents for annual exam.  Non-smoker.  Mammogram 2022 normal.  DEXA scan 2020 osteopenia.  Scheduling another 1.  Colonoscopy  and due again after 10 years.  No bleeding and no problems.    Obstetric History:  OB History        3    Para   3    Term   3            AB        Living           SAB        IAB        Ectopic        Molar        Multiple        Live Births                   Menstrual History:     No LMP recorded (lmp unknown). Patient is postmenopausal.       Past Medical History:   Diagnosis Date   • Muscle strain of upper extremity 3/1/2021   • Peptic ulcer     when she was 18 years old   • Post-menopausal      Family History   Problem Relation Age of Onset   • Breast cancer Maternal Grandmother    • Breast cancer Paternal Grandmother    • Coronary artery disease Mother         stent   • Atrial fibrillation Mother    • Gallbladder disease Mother    • Arthritis Father    • Coronary artery disease Father         s/p stent and cabg    • Hypertension Father    • No Known Problems Sister    • Hyperlipidemia Brother      Social History     Tobacco Use   Smoking Status Former   • Types: Cigarettes   • Quit date: 1983   • Years since quittin.8   Smokeless Tobacco Never   Tobacco Comments    just light smoker through college.          The following portions of the patient's history were reviewed and updated as appropriate: allergies, current medications, past family history, past medical history, past social history, past surgical history and problem list.    Review of Systems   Constitutional: Negative.  Negative for fever and unexpected weight change.   HENT: Negative.    Respiratory: Negative for shortness of breath and wheezing.    Cardiovascular: Negative for chest pain, palpitations and leg swelling.    Gastrointestinal: Negative for abdominal pain, anal bleeding and blood in stool.   Genitourinary: Negative for dysuria, pelvic pain, urgency, vaginal bleeding, vaginal discharge and vaginal pain.   Skin: Negative.    Neurological: Negative.    Hematological: Negative.  Negative for adenopathy.   Psychiatric/Behavioral: Negative.  Negative for dysphoric mood. The patient is not nervous/anxious.             Objective   Physical Exam  Exam conducted with a chaperone present.   Constitutional:       Appearance: Normal appearance. She is well-developed.   HENT:      Head: Normocephalic.   Neck:      Thyroid: No thyromegaly.      Trachea: Trachea normal. No tracheal deviation.   Cardiovascular:      Rate and Rhythm: Normal rate and regular rhythm.      Heart sounds: Normal heart sounds. No murmur heard.  Pulmonary:      Effort: Pulmonary effort is normal.      Breath sounds: Normal breath sounds.   Chest:   Breasts:     Right: Normal. No mass, nipple discharge or tenderness.      Left: Normal. No mass, nipple discharge or tenderness.   Abdominal:      Palpations: Abdomen is soft. There is no mass.      Tenderness: There is no abdominal tenderness.      Hernia: No hernia is present.   Genitourinary:     General: Normal vulva.      Labia:         Right: No tenderness or lesion.         Left: No tenderness or lesion.       Urethra: No prolapse or urethral lesion.      Vagina: Normal. No vaginal discharge or lesions.      Cervix: No cervical motion tenderness.      Uterus: Not enlarged and not tender.       Adnexa:         Right: No mass or tenderness.          Left: No mass or tenderness.        Rectum: Normal. No external hemorrhoid or internal hemorrhoid. Normal anal tone.      Comments: External genitalia normal   Lymphadenopathy:      Cervical: No cervical adenopathy.      Upper Body:      Right upper body: No axillary adenopathy.      Left upper body: No axillary adenopathy.   Skin:     General: Skin is warm and dry.  "     Findings: No rash.   Neurological:      Mental Status: She is alert and oriented to person, place, and time.   Psychiatric:         Behavior: Behavior normal.         /82   Ht 167.6 cm (66\")   Wt 59.9 kg (132 lb)   LMP  (LMP Unknown)   BMI 21.31 kg/m²     Assessment & Plan   Diagnoses and all orders for this visit:    1. Encounter for gynecological examination without abnormal finding (Primary)  -     IGP,rfx Aptima HPV All Pth    2. Encounter for breast cancer screening using non-mammogram modality    3. Osteopenia, unspecified location  -     DEXA Bone Density Axial; Future        Return to office 1 year.  Counseled about beginning calcium and vitamin D for osteoporosis prevention and getting another DEXA scan.  Colonoscopy is up-to-date.             "

## 2022-11-30 ENCOUNTER — HOSPITAL ENCOUNTER (EMERGENCY)
Facility: HOSPITAL | Age: 57
End: 2022-11-30

## 2022-11-30 NOTE — ED TRIAGE NOTES
Patient to Er via car from home for flu like symptoms x Sunday night  Patient has n/d no abd pain  Patient denies any exposure    Patient wearing mask this Rn in PPE

## 2022-12-02 ENCOUNTER — TELEPHONE (OUTPATIENT)
Dept: FAMILY MEDICINE CLINIC | Facility: CLINIC | Age: 57
End: 2022-12-02

## 2022-12-02 NOTE — TELEPHONE ENCOUNTER
Caller: Omid Dewey    Relationship to patient: Emergency Contact    Best call back number: [phone]    Date of exposure: UNKNOWN    Date of positive COVID19 test: 12-    Date if possible COVID19 exposure: UNKNOWN    COVID19 symptoms: DIARRHEA, NAUSEA, NO APPITITE, IS DRINKING FLUIDS WELL    Date of initial quarantine: 12-01-22    Additional information or concerns: WOULD LIKE CALL BACK REGARDING PAXLOVID PRESCRIPTION FOR PATIENT IF WARRANTED.      IS CONCERNED WITH POSSIBLE INTERACTION WITH CURRENT MEDICATIONS.    PLEASE CALL TO DISCUSS AND ADVISE.    What is the patients preferred pharmacy: [facility] [id number] - [place], [place] - [address] AT [address]  [phone] [facility] [phone] FX

## 2022-12-02 NOTE — TELEPHONE ENCOUNTER
CALLED AND INFORMED  WIFE WOULD NEED AN APPT. Sunday QUIJANO 5 DAYS FROM [facility] SO THEY ARE GOING TO TRY UC VIDEO VISIT AS WE DON'T HAVE ANY APPTS FOR TODAY. PT  VOICED UNDERSTANDING.

## 2022-12-13 ENCOUNTER — TELEPHONE (OUTPATIENT)
Dept: OBSTETRICS AND GYNECOLOGY | Facility: CLINIC | Age: 57
End: 2022-12-13

## 2023-01-09 ENCOUNTER — HOSPITAL ENCOUNTER (OUTPATIENT)
Dept: BONE DENSITY | Facility: HOSPITAL | Age: 58
End: 2023-01-09
Payer: COMMERCIAL

## 2023-03-20 DIAGNOSIS — E78.5 HYPERLIPIDEMIA, UNSPECIFIED HYPERLIPIDEMIA TYPE: Primary | ICD-10-CM

## 2023-03-27 ENCOUNTER — OFFICE VISIT (OUTPATIENT)
Dept: FAMILY MEDICINE CLINIC | Facility: CLINIC | Age: 58
End: 2023-03-27
Payer: COMMERCIAL

## 2023-03-27 VITALS
TEMPERATURE: 97.7 F | OXYGEN SATURATION: 94 % | SYSTOLIC BLOOD PRESSURE: 122 MMHG | DIASTOLIC BLOOD PRESSURE: 78 MMHG | BODY MASS INDEX: 21.65 KG/M2 | WEIGHT: 134.7 LBS | RESPIRATION RATE: 17 BRPM | HEART RATE: 68 BPM | HEIGHT: 66 IN

## 2023-03-27 DIAGNOSIS — E78.5 HYPERLIPIDEMIA, UNSPECIFIED HYPERLIPIDEMIA TYPE: ICD-10-CM

## 2023-03-27 DIAGNOSIS — Z00.00 ANNUAL PHYSICAL EXAM: Primary | ICD-10-CM

## 2023-03-27 DIAGNOSIS — D75.89 MACROCYTOSIS WITHOUT ANEMIA: ICD-10-CM

## 2023-03-27 PROCEDURE — 99396 PREV VISIT EST AGE 40-64: CPT | Performed by: FAMILY MEDICINE

## 2023-03-27 NOTE — PROGRESS NOTES
Chief Complaint  Annual Exam    Subjective        Ayana Silveira presents to [facility]  History of Present Illness  Ayana Silveira is a 57-year-old female who is presenting today for her annual physical. She follows with Dr. Carrillo for her gynecological care. Her last Pap smear was 11/2022, and it showed negative cytology. There was no HPV testing. Her last colonoscopy appears to have been in 2014. There was a 6 mm polyp in the rectosigmoid colon. Pathology showed hyperplastic polyp, so she is recommended for a 10-year follow-up. She had blood work prior to presentation, noted to have macrocytosis on the blood count, but normal hemoglobin. Cholesterol improved substantially from last year. LDL improved bilaterally 100. She is on Crestor for the last year. There is no anemia, but there is macrocytosis. We will add B12 and folate to her blood work today.    Chest wall pain  The chest wall pain she was having all last year has resolved.  She is taking the omeprazole combination with her cholesterol medication. Strange but feels after starting the crestor she had improvement of her pain.    Health maintenance  Her colonoscopy will be due next year in 2024.  She sees Dr. Carrillo.  Her mammogram will be due in 04/2023.    Health maintenance  She works out with her  twice a week.  She tries to do cardio and walk.  She tries to stay Protestant with her .  She shared that she had been drinking a couple of drinks nightly and then more on the weekend and she cut back substantially.  She has about a glass of wine a day.  She said this has been making her feel a lot better physically.  She feels like it has had a good positive impact on her blood pressure.    Medical history  She also shared last 05/2022 she went to [facility] because of a significant panic attack.  She just could not calm herself down, so she went in to be evaluated.  Her son took her there.  Wants to know  "about what they gave her for panic attack. She has not taken it but she wants to have it.  She received prescriptions for hydroxyzine for the panic and ondansetron related to the nausea that was causing.  She says she went home and was able to calm down and she has not taken either of those medications, but she has wanted to keep them on hand out of concern for recurrence.  But also states that she has been feeling very well and feels that her anxiety is in a good place currently with good control.  Objective   Vital Signs:  /78 (BP Location: Right arm, Patient Position: Sitting, Cuff Size: Adult)   Pulse 68   Temp 97.7 °F (36.5 °C) (Infrared)   Resp 17   Ht 167.6 cm (66\")   Wt 61.1 kg (134 lb 11.2 oz)   SpO2 94%   BMI 21.74 kg/m²     BMI is within normal parameters. No other follow-up for BMI required.      Physical Exam  Vitals reviewed.   Constitutional:       General: She is not in acute distress.     Appearance: Normal appearance. She is normal weight. She is not ill-appearing or toxic-appearing.   HENT:      Head: Normocephalic and atraumatic.      Right Ear: Tympanic membrane, ear canal and external ear normal. There is no impacted cerumen.      Left Ear: Tympanic membrane, ear canal and external ear normal. There is no impacted cerumen.      Nose: Nose normal.      Mouth/Throat:      Mouth: Mucous membranes are moist.      Pharynx: Oropharynx is clear. No oropharyngeal exudate or posterior oropharyngeal erythema.   Eyes:      General: No scleral icterus.        Right eye: No discharge.         Left eye: No discharge.      Conjunctiva/sclera: Conjunctivae normal.   Neck:      Thyroid: No thyromegaly.      Vascular: No carotid bruit.   Cardiovascular:      Rate and Rhythm: Normal rate and regular rhythm.      Heart sounds: Normal heart sounds. No murmur heard.    No friction rub. No gallop.   Pulmonary:      Effort: Pulmonary effort is normal. No respiratory distress.      Breath sounds: Normal " breath sounds. No wheezing or rales.   Chest:      Chest wall: No tenderness.   Abdominal:      General: Bowel sounds are normal. There is no distension.      Palpations: Abdomen is soft. There is no mass.      Tenderness: There is no abdominal tenderness. There is no guarding.   Musculoskeletal:         General: No deformity.      Cervical back: Neck supple.      Right lower leg: No edema.      Left lower leg: No edema.   Lymphadenopathy:      Cervical: No cervical adenopathy.   Skin:     Coloration: Skin is not jaundiced or pale.   Neurological:      General: No focal deficit present.      Mental Status: She is alert and oriented to person, place, and time.      Motor: No abnormal muscle tone.      Coordination: Coordination normal.   Psychiatric:         Mood and Affect: Mood normal.         Behavior: Behavior normal.          Result Review :    CMP        3/23/2023    08:10   CMP   Glucose 97     BUN 6     Creatinine 0.81     Sodium 143     Potassium 4.3     Chloride 103     Calcium 10.3     Total Protein 7.5     Albumin 4.7     Globulin 2.8     Total Bilirubin 0.3     Alkaline Phosphatase 46     AST (SGOT) 20     ALT (SGPT) 19     BUN/Creatinine Ratio 7.4       CBC w/diff        3/23/2023    08:10   CBC w/Diff   WBC 4.62     RBC 3.80     Hemoglobin 13.3     Hematocrit 38.6     .6     MCH 35.0     MCHC 34.5     RDW 12.0     Platelets 299     Neutrophil Rel % 37.8     Lymphocyte Rel % 48.1     Monocyte Rel % 9.1     Eosinophil Rel % 3.7     Basophil Rel % 1.3       Lipid Panel        3/23/2023    08:10   Lipid Panel   Total Cholesterol 248     Triglycerides 122     HDL Cholesterol 94     VLDL Cholesterol 21     LDL Cholesterol  133                     Assessment and Plan   Diagnoses and all orders for this visit:    1. Annual physical exam (Primary)    2. Hyperlipidemia, unspecified hyperlipidemia type    3. Macrocytosis without anemia  -     Vitamin B12; Future  -     Folate; Future      Annual  physical  Preventive counseling  -She iscaught up on her gynecological care. Her mammogram is scheduled for next month. Dr. Carrillo and she follows with him in 11/2022. Reviewed her last Pap smear results. Her colonoscopy will be due next 05/2024 for 10-year screening follow-up. Reviewed her labs. Her cholesterol improved by almost 100 points. We will continue on the Crestor. She works out pretty much daily with walking and then 2 days a week with her . She has a follow-up scheduled with Dr. Devine in the office to establish care.         Follow Up   No follow-ups on file.  Patient was given instructions and counseling regarding her condition or for health maintenance advice. Please see specific information pulled into the AVS if appropriate.       Transcribed from ambient dictation for Shereen Forbes MD by Fatuma Serna.  03/27/23   09:26 EDT    Patient or patient representative verbalized consent to the visit recording.  I have personally performed the services described in this document as transcribed by the above individual, and it is both accurate and complete.

## 2023-03-28 DIAGNOSIS — D75.89 MACROCYTOSIS WITHOUT ANEMIA: ICD-10-CM

## 2023-04-28 ENCOUNTER — PROCEDURE VISIT (OUTPATIENT)
Dept: OBSTETRICS AND GYNECOLOGY | Facility: CLINIC | Age: 58
End: 2023-04-28
Payer: COMMERCIAL

## 2023-04-28 DIAGNOSIS — Z12.31 VISIT FOR SCREENING MAMMOGRAM: Primary | ICD-10-CM

## 2023-05-04 RX ORDER — OMEPRAZOLE 40 MG/1
40 CAPSULE, DELAYED RELEASE ORAL DAILY
Qty: 90 CAPSULE | Refills: 1 | Status: SHIPPED | OUTPATIENT
Start: 2023-05-04

## 2023-05-04 RX ORDER — ROSUVASTATIN CALCIUM 10 MG/1
TABLET, COATED ORAL
Qty: 90 TABLET | Refills: 3 | Status: SHIPPED | OUTPATIENT
Start: 2023-05-04

## 2023-05-15 ENCOUNTER — TELEPHONE (OUTPATIENT)
Dept: FAMILY MEDICINE CLINIC | Facility: CLINIC | Age: 58
End: 2023-05-15

## 2023-05-15 ENCOUNTER — OFFICE VISIT (OUTPATIENT)
Dept: FAMILY MEDICINE CLINIC | Facility: CLINIC | Age: 58
End: 2023-05-15
Payer: COMMERCIAL

## 2023-05-15 VITALS
SYSTOLIC BLOOD PRESSURE: 138 MMHG | TEMPERATURE: 97.5 F | BODY MASS INDEX: 21.21 KG/M2 | OXYGEN SATURATION: 98 % | WEIGHT: 132 LBS | RESPIRATION RATE: 18 BRPM | DIASTOLIC BLOOD PRESSURE: 88 MMHG | HEART RATE: 68 BPM | HEIGHT: 66 IN

## 2023-05-15 DIAGNOSIS — Z79.899 MEDICATION MANAGEMENT: ICD-10-CM

## 2023-05-15 DIAGNOSIS — E78.2 MIXED HYPERLIPIDEMIA: Primary | ICD-10-CM

## 2023-05-15 PROCEDURE — 99213 OFFICE O/P EST LOW 20 MIN: CPT | Performed by: FAMILY MEDICINE

## 2023-05-15 NOTE — PROGRESS NOTES
"Chief Complaint  Chief Complaint   Patient presents with   • Establish Care     New Pt transfer from         Subjective    History of Present Illness        Ayana Silveira presents to [facility] for   Hyperlipidemia  This is a chronic problem. The current episode started more than 1 year ago. The problem is controlled. Recent lipid tests were reviewed and are high (significantly improved since last year.). She has no history of nephrotic syndrome. Pertinent negatives include no focal sensory loss, focal weakness, leg pain, myalgias or shortness of breath. Current antihyperlipidemic treatment includes statins. The current treatment provides significant improvement of lipids. There are no compliance problems.  Risk factors for coronary artery disease include family history and dyslipidemia.        Objective   Vital Signs:   Visit Vitals  /88   Pulse 68   Temp 97.5 °F (36.4 °C)   Resp 18   Ht 167.6 cm (66\")   Wt 59.9 kg (132 lb)   LMP  (LMP Unknown)   SpO2 98%   BMI 21.31 kg/m²       BMI is within normal parameters. No other follow-up for BMI required.     Physical Exam  Vitals reviewed.   Constitutional:       Appearance: She is well-developed.   HENT:      Head: Normocephalic.      Right Ear: External ear normal.      Left Ear: External ear normal.      Nose: Nose normal.   Eyes:      Conjunctiva/sclera: Conjunctivae normal.   Cardiovascular:      Rate and Rhythm: Normal rate and regular rhythm.   Pulmonary:      Effort: Pulmonary effort is normal.      Breath sounds: Normal breath sounds.   Musculoskeletal:         General: Normal range of motion.      Cervical back: Normal range of motion and neck supple.   Skin:     General: Skin is warm and dry.      Capillary Refill: Capillary refill takes less than 2 seconds.   Neurological:      Mental Status: She is alert and oriented to person, place, and time.            Result Review :                    Assessment and Plan    "   Diagnoses and all orders for this visit:    1. Mixed hyperlipidemia (Primary)  Assessment & Plan:  Lipid abnormalities are improving with treatment.  Nutritional counseling was provided.  Lipids will be reassessed in 6 months.             Follow Up   No follow-ups on file.  Patient was given instructions and counseling regarding her condition or for health maintenance advice. Please see specific information pulled into the AVS if appropriate.

## 2023-05-18 ENCOUNTER — TELEPHONE (OUTPATIENT)
Dept: FAMILY MEDICINE CLINIC | Facility: CLINIC | Age: 58
End: 2023-05-18

## 2023-05-18 NOTE — TELEPHONE ENCOUNTER
Hub staff attempted to follow warm transfer process and was unsuccessful     Caller: Ayana Silveira    Relationship to patient: Self    Best call back number:    Patient is needing: PATIENT IS NEEDING TO SCHEDULE LABS     PLEASE CALL AND ADVISE

## 2023-09-21 ENCOUNTER — APPOINTMENT (OUTPATIENT)
Dept: CT IMAGING | Facility: HOSPITAL | Age: 58
End: 2023-09-21
Payer: COMMERCIAL

## 2023-09-21 ENCOUNTER — APPOINTMENT (OUTPATIENT)
Dept: CARDIOLOGY | Facility: HOSPITAL | Age: 58
End: 2023-09-21
Payer: COMMERCIAL

## 2023-09-21 ENCOUNTER — TELEPHONE (OUTPATIENT)
Dept: FAMILY MEDICINE CLINIC | Facility: CLINIC | Age: 58
End: 2023-09-21
Payer: COMMERCIAL

## 2023-09-21 ENCOUNTER — HOSPITAL ENCOUNTER (EMERGENCY)
Facility: HOSPITAL | Age: 58
Discharge: HOME | End: 2023-09-21
Attending: EMERGENCY MEDICINE
Payer: COMMERCIAL

## 2023-09-21 VITALS
WEIGHT: 127.9 LBS | RESPIRATION RATE: 16 BRPM | TEMPERATURE: 97.9 F | SYSTOLIC BLOOD PRESSURE: 117 MMHG | HEART RATE: 58 BPM | OXYGEN SATURATION: 99 % | DIASTOLIC BLOOD PRESSURE: 82 MMHG | BODY MASS INDEX: 20.55 KG/M2 | HEIGHT: 66 IN

## 2023-09-21 DIAGNOSIS — H53.9 VISUAL DISTURBANCE: ICD-10-CM

## 2023-09-21 DIAGNOSIS — R29.90 STROKE-LIKE SYMPTOMS: Primary | ICD-10-CM

## 2023-09-21 DIAGNOSIS — G45.9 TIA (TRANSIENT ISCHEMIC ATTACK): Primary | ICD-10-CM

## 2023-09-21 LAB
ALBUMIN SERPL-MCNC: 4.6 G/DL (ref 3.5–5.2)
ALBUMIN/GLOB SERPL: 1.4 G/DL
ALP SERPL-CCNC: 37 U/L (ref 39–117)
ALT SERPL W P-5'-P-CCNC: 12 U/L (ref 1–33)
ANION GAP SERPL CALCULATED.3IONS-SCNC: 15.7 MMOL/L (ref 5–15)
AST SERPL-CCNC: 16 U/L (ref 1–32)
ATMOSPHERIC PRESS: 754.3 MMHG
ATMOSPHERIC PRESS: 754.4 MMHG
B-OH-BUTYR SERPL-SCNC: 0.38 MMOL/L (ref 0.02–0.27)
BASE EXCESS BLDV CALC-SCNC: -7.4 MMOL/L (ref -2–2)
BASE EXCESS BLDV CALC-SCNC: 1.5 MMOL/L (ref -2–2)
BASOPHILS # BLD AUTO: 0.05 10*3/MM3 (ref 0–0.2)
BASOPHILS NFR BLD AUTO: 0.8 % (ref 0–1.5)
BH CV XLRA MEAS LEFT DIST CCA EDV: 25.8 CM/SEC
BH CV XLRA MEAS LEFT DIST CCA PSV: 91.1 CM/SEC
BH CV XLRA MEAS LEFT DIST ICA EDV: -30.7 CM/SEC
BH CV XLRA MEAS LEFT DIST ICA PSV: -89.5 CM/SEC
BH CV XLRA MEAS LEFT ICA/CCA RATIO: 0.98
BH CV XLRA MEAS LEFT MID ICA EDV: -30.2 CM/SEC
BH CV XLRA MEAS LEFT MID ICA PSV: -86.7 CM/SEC
BH CV XLRA MEAS LEFT PROX CCA EDV: 22.5 CM/SEC
BH CV XLRA MEAS LEFT PROX CCA PSV: 89.5 CM/SEC
BH CV XLRA MEAS LEFT PROX ECA EDV: -12.6 CM/SEC
BH CV XLRA MEAS LEFT PROX ECA PSV: -76.3 CM/SEC
BH CV XLRA MEAS LEFT PROX ICA EDV: -23 CM/SEC
BH CV XLRA MEAS LEFT PROX ICA PSV: -77.4 CM/SEC
BH CV XLRA MEAS LEFT PROX SCLA PSV: 123.1 CM/SEC
BH CV XLRA MEAS LEFT VERTEBRAL A EDV: -15.1 CM/SEC
BH CV XLRA MEAS LEFT VERTEBRAL A PSV: -50.8 CM/SEC
BH CV XLRA MEAS RIGHT DIST CCA EDV: 27.3 CM/SEC
BH CV XLRA MEAS RIGHT DIST CCA PSV: 87.6 CM/SEC
BH CV XLRA MEAS RIGHT DIST ICA EDV: -24.6 CM/SEC
BH CV XLRA MEAS RIGHT DIST ICA PSV: -65.4 CM/SEC
BH CV XLRA MEAS RIGHT ICA/CCA RATIO: 0.99
BH CV XLRA MEAS RIGHT MID ICA EDV: -31.5 CM/SEC
BH CV XLRA MEAS RIGHT MID ICA PSV: -86.5 CM/SEC
BH CV XLRA MEAS RIGHT PROX CCA EDV: 18.6 CM/SEC
BH CV XLRA MEAS RIGHT PROX CCA PSV: 79.5 CM/SEC
BH CV XLRA MEAS RIGHT PROX ECA EDV: -14.9 CM/SEC
BH CV XLRA MEAS RIGHT PROX ECA PSV: -102.5 CM/SEC
BH CV XLRA MEAS RIGHT PROX ICA EDV: -22.6 CM/SEC
BH CV XLRA MEAS RIGHT PROX ICA PSV: -85.5 CM/SEC
BH CV XLRA MEAS RIGHT PROX SCLA PSV: 94.9 CM/SEC
BH CV XLRA MEAS RIGHT VERTEBRAL A EDV: 18.4 CM/SEC
BH CV XLRA MEAS RIGHT VERTEBRAL A PSV: 63.7 CM/SEC
BILIRUB SERPL-MCNC: 0.6 MG/DL (ref 0–1.2)
BUN SERPL-MCNC: 12 MG/DL (ref 8–23)
BUN/CREAT SERPL: 16 (ref 7–25)
CA-I BLD-MCNC: 5 MG/DL (ref 4.6–5.4)
CA-I SERPL ISE-MCNC: 1.25 MMOL/L (ref 1.15–1.35)
CALCIUM SPEC-SCNC: 9.6 MG/DL (ref 8.6–10.5)
CHLORIDE SERPL-SCNC: 101 MMOL/L (ref 98–107)
CO2 BLDA-SCNC: 21.7 MMOL/L (ref 23–27)
CO2 BLDA-SCNC: 27.4 MMOL/L (ref 23–27)
CO2 SERPL-SCNC: 23.3 MMOL/L (ref 22–29)
CREAT SERPL-MCNC: 0.75 MG/DL (ref 0.57–1)
DEPRECATED RDW RBC AUTO: 42.8 FL (ref 37–54)
DEVICE COMMENT: ABNORMAL
DEVICE COMMENT: ABNORMAL
EGFRCR SERPLBLD CKD-EPI 2021: 91.3 ML/MIN/1.73
EOSINOPHIL # BLD AUTO: 0.03 10*3/MM3 (ref 0–0.4)
EOSINOPHIL NFR BLD AUTO: 0.5 % (ref 0.3–6.2)
ERYTHROCYTE [DISTWIDTH] IN BLOOD BY AUTOMATED COUNT: 12 % (ref 12.3–15.4)
GLOBULIN UR ELPH-MCNC: 3.2 GM/DL
GLUCOSE BLDC GLUCOMTR-MCNC: 90 MG/DL (ref 70–130)
GLUCOSE SERPL-MCNC: 91 MG/DL (ref 65–99)
HCO3 BLDV-SCNC: 20.3 MMOL/L (ref 22–28)
HCO3 BLDV-SCNC: 26.1 MMOL/L (ref 22–28)
HCT VFR BLD AUTO: 36.7 % (ref 34–46.6)
HGB BLD-MCNC: 12.7 G/DL (ref 12–15.9)
IMM GRANULOCYTES # BLD AUTO: 0.02 10*3/MM3 (ref 0–0.05)
IMM GRANULOCYTES NFR BLD AUTO: 0.3 % (ref 0–0.5)
LYMPHOCYTES # BLD AUTO: 2.21 10*3/MM3 (ref 0.7–3.1)
LYMPHOCYTES NFR BLD AUTO: 36.8 % (ref 19.6–45.3)
MCH RBC QN AUTO: 34 PG (ref 26.6–33)
MCHC RBC AUTO-ENTMCNC: 34.6 G/DL (ref 31.5–35.7)
MCV RBC AUTO: 98.1 FL (ref 79–97)
MODALITY: ABNORMAL
MODALITY: ABNORMAL
MONOCYTES # BLD AUTO: 0.36 10*3/MM3 (ref 0.1–0.9)
MONOCYTES NFR BLD AUTO: 6 % (ref 5–12)
NEUTROPHILS NFR BLD AUTO: 3.33 10*3/MM3 (ref 1.7–7)
NEUTROPHILS NFR BLD AUTO: 55.6 % (ref 42.7–76)
NRBC BLD AUTO-RTO: 0 /100 WBC (ref 0–0.2)
PCO2 BLDV: 40.4 MM HG (ref 41–51)
PCO2 BLDV: 47.9 MM HG (ref 41–51)
PH BLDV: 7.24 PH UNITS (ref 7.31–7.41)
PH BLDV: 7.42 PH UNITS (ref 7.31–7.41)
PLATELET # BLD AUTO: 235 10*3/MM3 (ref 140–450)
PMV BLD AUTO: 9.9 FL (ref 6–12)
PO2 BLDV: 25.8 MM HG (ref 35–45)
PO2 BLDV: 28.6 MM HG (ref 35–45)
POTASSIUM SERPL-SCNC: 3.8 MMOL/L (ref 3.5–5.2)
PROT SERPL-MCNC: 7.8 G/DL (ref 6–8.5)
RBC # BLD AUTO: 3.74 10*6/MM3 (ref 3.77–5.28)
SAO2 % BLDCOV: 36.8 % (ref 45–75)
SAO2 % BLDCOV: 55.3 % (ref 45–75)
SODIUM SERPL-SCNC: 140 MMOL/L (ref 136–145)
TOTAL RATE: 18 BREATHS/MINUTE
TOTAL RATE: 18 BREATHS/MINUTE
WBC NRBC COR # BLD: 6 10*3/MM3 (ref 3.4–10.8)

## 2023-09-21 PROCEDURE — 93880 EXTRACRANIAL BILAT STUDY: CPT

## 2023-09-21 PROCEDURE — 82803 BLOOD GASES ANY COMBINATION: CPT

## 2023-09-21 PROCEDURE — 80053 COMPREHEN METABOLIC PANEL: CPT | Performed by: STUDENT IN AN ORGANIZED HEALTH CARE EDUCATION/TRAINING PROGRAM

## 2023-09-21 PROCEDURE — 82948 REAGENT STRIP/BLOOD GLUCOSE: CPT

## 2023-09-21 PROCEDURE — 85025 COMPLETE CBC W/AUTO DIFF WBC: CPT | Performed by: STUDENT IN AN ORGANIZED HEALTH CARE EDUCATION/TRAINING PROGRAM

## 2023-09-21 PROCEDURE — 82010 KETONE BODYS QUAN: CPT | Performed by: STUDENT IN AN ORGANIZED HEALTH CARE EDUCATION/TRAINING PROGRAM

## 2023-09-21 PROCEDURE — 99284 EMERGENCY DEPT VISIT MOD MDM: CPT

## 2023-09-21 PROCEDURE — 82330 ASSAY OF CALCIUM: CPT | Performed by: STUDENT IN AN ORGANIZED HEALTH CARE EDUCATION/TRAINING PROGRAM

## 2023-09-21 PROCEDURE — 70450 CT HEAD/BRAIN W/O DYE: CPT

## 2023-09-21 NOTE — ED PROVIDER NOTES
EMERGENCY DEPARTMENT ENCOUNTER    Room Number:    PCP: Aureliano Devine Sr., MD  History obtained from: Patient      HPI:  Chief Complaint: Visual disturbance  A complete HPI/ROS/PMH/PSH/SH/FH are unobtainable due to: Not applicable  Context: Ayana Silveira is a 58 y.o. female who presents to the ED c/o visual disturbance.  Started around half an hour ago, had associated left-sided numbness involving her face, left arm, left leg.  Numbness only lasted around 1 minute.  Also had a nonspecific visual change, still has it although difficult to describe.  No visual field cuts or blindness.  Does not improve with eye closing.            PAST MEDICAL HISTORY  Active Ambulatory Problems     Diagnosis Date Noted    HLD (hyperlipidemia) 2021     Resolved Ambulatory Problems     Diagnosis Date Noted    Muscle strain of upper extremity 2021     Past Medical History:   Diagnosis Date    Anxiety     Peptic ulcer     Post-menopausal     Urinary tract infection 2021         PAST SURGICAL HISTORY  Past Surgical History:   Procedure Laterality Date    COLONOSCOPY      ORIF ULNAR / RADIAL SHAFT FRACTURE      TONSILLECTOMY           FAMILY HISTORY  Family History   Problem Relation Age of Onset    Breast cancer Maternal Grandmother     Breast cancer Paternal Grandmother     Coronary artery disease Mother         stent    Atrial fibrillation Mother     Gallbladder disease Mother     Anxiety disorder Mother     Arthritis Father     Coronary artery disease Father         s/p stent and cabg     Hypertension Father     Anxiety disorder Sister     Hyperlipidemia Brother          SOCIAL HISTORY  Social History     Socioeconomic History    Marital status:      Spouse name: Omid Dewey    Number of children: 3   Tobacco Use    Smoking status: Former     Types: Cigarettes     Quit date: 1983     Years since quittin.7    Smokeless tobacco: Never    Tobacco comments:     just light smoker through  college.    Substance and Sexual Activity    Alcohol use: Yes     Alcohol/week: 10.0 standard drinks     Types: 6 Glasses of wine, 4 Drinks containing 0.5 oz of alcohol per week     Comment: one glass of wine per day    Drug use: Never    Sexual activity: Yes     Partners: Male     Birth control/protection: Post-menopausal         ALLERGIES  Sulfa antibiotics        REVIEW OF SYSTEMS    As per HPI      PHYSICAL EXAM  ED Triage Vitals   Temp Heart Rate Resp BP SpO2   09/21/23 1102 09/21/23 1102 09/21/23 1102 09/21/23 1111 09/21/23 1102   97.9 °F (36.6 °C) 78 20 (!) 176/115 98 %      Temp src Heart Rate Source Patient Position BP Location FiO2 (%)   09/21/23 1102 -- -- -- --   Tympanic           Physical Exam  Constitutional:       General: She is not in acute distress.  HENT:      Head: Normocephalic and atraumatic.   Cardiovascular:      Rate and Rhythm: Normal rate and regular rhythm.   Pulmonary:      Effort: Pulmonary effort is normal. No respiratory distress.   Abdominal:      General: There is no distension.      Palpations: Abdomen is soft.      Tenderness: There is no abdominal tenderness.   Musculoskeletal:         General: No swelling or deformity.   Skin:     General: Skin is warm and dry.   Neurological:      Mental Status: She is alert. Mental status is at baseline.      Comments: No visual field cuts, normal extraocular movements, intact strength.         Vital signs and nursing notes reviewed.          LAB RESULTS  Recent Results (from the past 24 hour(s))   POC Glucose Once    Collection Time: 09/21/23 11:08 AM    Specimen: Blood   Result Value Ref Range    Glucose 90 70 - 130 mg/dL   Comprehensive Metabolic Panel    Collection Time: 09/21/23 11:22 AM    Specimen: Blood   Result Value Ref Range    Glucose 91 65 - 99 mg/dL    BUN 12 8 - 23 mg/dL    Creatinine 0.75 0.57 - 1.00 mg/dL    Sodium 140 136 - 145 mmol/L    Potassium 3.8 3.5 - 5.2 mmol/L    Chloride 101 98 - 107 mmol/L    CO2 23.3 22.0 - 29.0  mmol/L    Calcium 9.6 8.6 - 10.5 mg/dL    Total Protein 7.8 6.0 - 8.5 g/dL    Albumin 4.6 3.5 - 5.2 g/dL    ALT (SGPT) 12 1 - 33 U/L    AST (SGOT) 16 1 - 32 U/L    Alkaline Phosphatase 37 (L) 39 - 117 U/L    Total Bilirubin 0.6 0.0 - 1.2 mg/dL    Globulin 3.2 gm/dL    A/G Ratio 1.4 g/dL    BUN/Creatinine Ratio 16.0 7.0 - 25.0    Anion Gap 15.7 (H) 5.0 - 15.0 mmol/L    eGFR 91.3 >60.0 mL/min/1.73   Calcium, Ionized    Collection Time: 09/21/23 11:22 AM    Specimen: Blood   Result Value Ref Range    Ionized Calcium 1.25 1.15 - 1.35 mmol/L    Ionized Calcium 5.0 4.6 - 5.4 mg/dL   CBC Auto Differential    Collection Time: 09/21/23 11:22 AM    Specimen: Blood   Result Value Ref Range    WBC 6.00 3.40 - 10.80 10*3/mm3    RBC 3.74 (L) 3.77 - 5.28 10*6/mm3    Hemoglobin 12.7 12.0 - 15.9 g/dL    Hematocrit 36.7 34.0 - 46.6 %    MCV 98.1 (H) 79.0 - 97.0 fL    MCH 34.0 (H) 26.6 - 33.0 pg    MCHC 34.6 31.5 - 35.7 g/dL    RDW 12.0 (L) 12.3 - 15.4 %    RDW-SD 42.8 37.0 - 54.0 fl    MPV 9.9 6.0 - 12.0 fL    Platelets 235 140 - 450 10*3/mm3    Neutrophil % 55.6 42.7 - 76.0 %    Lymphocyte % 36.8 19.6 - 45.3 %    Monocyte % 6.0 5.0 - 12.0 %    Eosinophil % 0.5 0.3 - 6.2 %    Basophil % 0.8 0.0 - 1.5 %    Immature Grans % 0.3 0.0 - 0.5 %    Neutrophils, Absolute 3.33 1.70 - 7.00 10*3/mm3    Lymphocytes, Absolute 2.21 0.70 - 3.10 10*3/mm3    Monocytes, Absolute 0.36 0.10 - 0.90 10*3/mm3    Eosinophils, Absolute 0.03 0.00 - 0.40 10*3/mm3    Basophils, Absolute 0.05 0.00 - 0.20 10*3/mm3    Immature Grans, Absolute 0.02 0.00 - 0.05 10*3/mm3    nRBC 0.0 0.0 - 0.2 /100 WBC   Beta Hydroxybutyrate Quantitative    Collection Time: 09/21/23 11:22 AM    Specimen: Blood   Result Value Ref Range    Beta-Hydroxybutyrate Quant 0.375 (H) 0.020 - 0.270 mmol/L   Blood Gas, Venous -    Collection Time: 09/21/23 12:06 PM    Specimen: Venous Blood   Result Value Ref Range    pH, Venous 7.235 (C) 7.310 - 7.410 pH Units    pCO2, Venous 47.9 41.0 - 51.0 mm  Hg    pO2, Venous 25.8 (L) 35.0 - 45.0 mm Hg    HCO3, Venous 20.3 (L) 22.0 - 28.0 mmol/L    Base Excess, Venous -7.4 (L) -2.0 - 2.0 mmol/L    O2 Saturation, Venous 36.8 (L) 45.0 - 75.0 %    CO2 Content 21.7 (L) 23 - 27 mmol/L    Barometric Pressure for Blood Gas 754.4000 mmHg    Modality Room Air     Rate 18 Breaths/minute    Device Comment [id number]    Blood Gas, Venous -    Collection Time: 09/21/23  2:09 PM    Specimen: Venous Blood   Result Value Ref Range    pH, Venous 7.419 (H) 7.310 - 7.410 pH Units    pCO2, Venous 40.4 (L) 41.0 - 51.0 mm Hg    pO2, Venous 28.6 (L) 35.0 - 45.0 mm Hg    HCO3, Venous 26.1 22.0 - 28.0 mmol/L    Base Excess, Venous 1.5 -2.0 - 2.0 mmol/L    O2 Saturation, Venous 55.3 45.0 - 75.0 %    CO2 Content 27.4 (H) 23 - 27 mmol/L    Barometric Pressure for Blood Gas 754.3000 mmHg    Modality Room Air     Rate 18 Breaths/minute    Device Comment [id number]    Duplex Carotid Ultrasound CAR    Collection Time: 09/21/23  3:11 PM   Result Value Ref Range    Prox CCA PSV 79.5 cm/sec    Prox CCA EDV 18.6 cm/sec    Dist CCA PSV 87.6 cm/sec    Dist CCA EDV 27.3 cm/sec    Prox ICA PSV -85.5 cm/sec    Prox ICA EDV -22.6 cm/sec    Mid ICA PSV -86.5 cm/sec    Mid ICA EDV -31.5 cm/sec    Dist ICA PSV -65.4 cm/sec    Dist ICA EDV -24.6 cm/sec    Prox ECA PSV -102.5 cm/sec    Prox ECA EDV -14.9 cm/sec    Vertebral A PSV 63.7 cm/sec    Vertebral A EDV 18.4 cm/sec    Prox SCLA PSV 94.9 cm/sec    Prox CCA PSV 89.5 cm/sec    Prox CCA EDV 22.5 cm/sec    Dist CCA PSV 91.1 cm/sec    Dist CCA EDV 25.8 cm/sec    Prox ICA PSV -77.4 cm/sec    Prox ICA EDV -23.0 cm/sec    Mid ICA PSV -86.7 cm/sec    Mid ICA EDV -30.2 cm/sec    Dist ICA PSV -89.5 cm/sec    Dist ICA EDV -30.7 cm/sec    Prox ECA PSV -76.3 cm/sec    Prox ECA EDV -12.6 cm/sec    Vertebral A PSV -50.8 cm/sec    Vertebral A EDV -15.1 cm/sec    Prox SCLA .1 cm/sec    ICA/CCA ratio 0.99     ICA/CCA ratio 0.98        Ordered the above labs and reviewed  the results.        RADIOLOGY  CT Head Without Contrast    Result Date: 9/21/2023  CT HEAD WITHOUT CONTRAST  HISTORY: Numbness, tingling.  COMPARISON: None.  FINDINGS: The brain ventricles are symmetrical. There is no evidence of hemorrhage, hydrocephalus or of abnormal extra-axial fluid. No focal area of decreased attenuation to suggest acute infarction is identified. Mild to moderate vascular calcification is noted.      There is no evidence of acute infarction or intracranial hemorrhage. Further evaluation could be performed with MRI examination of the brain as indicated.    Radiation dose reduction techniques were utilized, including automated exposure control and exposure modulation based on body size.   This report was finalized on 9/21/2023 2:23 PM by Dr. Jatinder Spann M.D.      Duplex Carotid Ultrasound CAR    Result Date: 9/21/2023    Right internal carotid artery demonstrates a less than 50% stenosis.   Left internal carotid artery demonstrates normal flow without evidence of hemodynamically significant stenosis.      Ordered the above noted radiological studies. Reviewed by me in PACS.              MEDICATIONS GIVEN IN ER  Medications - No data to display            MEDICAL DECISION MAKING, PROGRESS, and CONSULTS    MDM: Patient presented emergency department with strokelike symptoms, otherwise well-appearing, vitals otherwise stable.  Otherwise very healthy.  Labs and imaging otherwise reassuring in the ER.  Unclear etiology of symptoms.  On patient arrival discussed case with Dr. Taylor, recommends MRI and carotid ultrasound for further evaluation.  Certainly could represent TIA.  Discussed recommendations with patient, recommendation for observation for additional testing however patient would like to go home.  I discussed her case with her primary care provider Dr. Devine, he reports he will try and obtain additional testing as an outpatient.  He reports he can likely see the patient tomorrow in  the office.  Given return precautions and discharged home.    All labs have been independently reviewed by me.  All radiology studies have been reviewed by me and I have also reviewed the radiology report.   EKG's independently viewed and interpreted by me.  Discussion below represents my analysis of pertinent findings related to patient's condition, differential diagnosis, treatment plan and final disposition.      Additional sources:  - Discussed/ obtained information from independent historians: Discussed case with family who are at bedside who reports that patient was initially distraught by her symptoms    - External (non-ED) record review:     - Chronic or social conditions impacting care:     - Shared decision making: Discussed recommendation for observation, given the patient is going home recommended to return for any new or worsening symptoms.  We will try and expedite outpatient work-up.      Orders placed during this visit:  Orders Placed This Encounter   Procedures    CT Head Without Contrast    MRI Brain Without Contrast    Comprehensive Metabolic Panel    Blood Gas, Venous -    Calcium, Ionized    CBC Auto Differential    Blood Gas, Venous -    Beta Hydroxybutyrate Quantitative    Blood Gas, Venous -    Blood Gas, Venous -    POC Glucose Once    CBC & Differential         Additional orders considered but not ordered:  Considered MRI and angiograms however no indication at this time.        Differential diagnosis includes but is not limited to:    Stroke, TIA, electrolyte abnormality, hypertensive crisis, posterior vasoconstriction syndrome      Independent interpretation of labs, radiology studies, and discussions with consultants:  ED Course as of 09/21/23 1532   Thu Sep 21, 2023   1241 CT head interpreted myself:  No hemorrhage or midline shift, asymmetry of the posterior lateral ventricles [FS]   1437 pH, Venous(!): 7.419 [FS]      ED Course User Index  [FS] Ruy Morris MD            DIAGNOSIS  Final diagnoses:   Stroke-like symptoms   Visual disturbance         DISPOSITION  Discharged home        Latest Documented Vital Signs:  As of 15:32 EDT  BP- 123/74 HR- 58 Temp- 97.9 °F (36.6 °C) (Tympanic) O2 sat- 99%              --    Please note that portions of this were completed with a voice recognition program.       Note Disclaimer: At [facility], we believe that sharing information builds trust and better relationships. You are receiving this note because you are receiving care at [facility] or recently visited. It is possible you will see health information before a provider has talked with you about it. This kind of information can be easy to misunderstand. To help you fully understand what it means for your health, we urge you to discuss this note with your provider.             Ruy Morris MD  09/21/23 8341

## 2023-09-21 NOTE — ED NOTES
"Pt. Arrives from home with c/o of intermittent numbness/tingling going down the left leg, and \"flashing\" in her field of vision that resolved. Pt is Aox4 at time of assessment, and currently has no c/o of the aforementioned symptoms. At the end of the assessment, pt c/o of tingling down the left leg again, that quickly resolved. Call light placed within reach.   "

## 2023-09-24 ENCOUNTER — APPOINTMENT (OUTPATIENT)
Dept: MRI IMAGING | Facility: HOSPITAL | Age: 58
End: 2023-09-24
Payer: COMMERCIAL

## 2023-09-24 ENCOUNTER — HOSPITAL ENCOUNTER (OUTPATIENT)
Facility: HOSPITAL | Age: 58
Setting detail: OBSERVATION
Discharge: HOME | End: 2023-09-25
Attending: EMERGENCY MEDICINE | Admitting: EMERGENCY MEDICINE
Payer: COMMERCIAL

## 2023-09-24 DIAGNOSIS — E78.2 MIXED HYPERLIPIDEMIA: ICD-10-CM

## 2023-09-24 DIAGNOSIS — R29.90 STROKE-LIKE SYMPTOMS: Primary | ICD-10-CM

## 2023-09-24 DIAGNOSIS — R20.2 PARESTHESIA OF LEFT ARM AND LEG: ICD-10-CM

## 2023-09-24 DIAGNOSIS — R20.2 FACIAL PARESTHESIA: ICD-10-CM

## 2023-09-24 PROBLEM — R20.0 LEFT SIDED NUMBNESS: Status: ACTIVE | Noted: 2023-09-24

## 2023-09-24 LAB
ALBUMIN SERPL-MCNC: 4.7 G/DL (ref 3.5–5.2)
ALBUMIN/GLOB SERPL: 1.5 G/DL
ALP SERPL-CCNC: 39 U/L (ref 39–117)
ALT SERPL W P-5'-P-CCNC: 14 U/L (ref 1–33)
ANION GAP SERPL CALCULATED.3IONS-SCNC: 15.1 MMOL/L (ref 5–15)
AST SERPL-CCNC: 18 U/L (ref 1–32)
BASOPHILS # BLD AUTO: 0.04 10*3/MM3 (ref 0–0.2)
BASOPHILS NFR BLD AUTO: 0.6 % (ref 0–1.5)
BILIRUB SERPL-MCNC: 0.3 MG/DL (ref 0–1.2)
BUN SERPL-MCNC: 8 MG/DL (ref 8–23)
BUN/CREAT SERPL: 10.7 (ref 7–25)
CALCIUM SPEC-SCNC: 9.5 MG/DL (ref 8.6–10.5)
CHLORIDE SERPL-SCNC: 103 MMOL/L (ref 98–107)
CHOLEST SERPL-MCNC: 263 MG/DL (ref 0–200)
CO2 SERPL-SCNC: 25.9 MMOL/L (ref 22–29)
CREAT SERPL-MCNC: 0.75 MG/DL (ref 0.57–1)
DEPRECATED RDW RBC AUTO: 43.9 FL (ref 37–54)
EGFRCR SERPLBLD CKD-EPI 2021: 91.3 ML/MIN/1.73
EOSINOPHIL # BLD AUTO: 0.04 10*3/MM3 (ref 0–0.4)
EOSINOPHIL NFR BLD AUTO: 0.6 % (ref 0.3–6.2)
ERYTHROCYTE [DISTWIDTH] IN BLOOD BY AUTOMATED COUNT: 12.1 % (ref 12.3–15.4)
GLOBULIN UR ELPH-MCNC: 3.2 GM/DL
GLUCOSE SERPL-MCNC: 101 MG/DL (ref 65–99)
HBA1C MFR BLD: 5.4 % (ref 4.8–5.6)
HCT VFR BLD AUTO: 36 % (ref 34–46.6)
HDLC SERPL-MCNC: 130 MG/DL (ref 40–60)
HGB BLD-MCNC: 12.3 G/DL (ref 12–15.9)
IMM GRANULOCYTES # BLD AUTO: 0.01 10*3/MM3 (ref 0–0.05)
IMM GRANULOCYTES NFR BLD AUTO: 0.2 % (ref 0–0.5)
LDLC SERPL CALC-MCNC: 121 MG/DL (ref 0–100)
LDLC/HDLC SERPL: 0.91 {RATIO}
LYMPHOCYTES # BLD AUTO: 2.28 10*3/MM3 (ref 0.7–3.1)
LYMPHOCYTES NFR BLD AUTO: 34.8 % (ref 19.6–45.3)
MCH RBC QN AUTO: 33.7 PG (ref 26.6–33)
MCHC RBC AUTO-ENTMCNC: 34.2 G/DL (ref 31.5–35.7)
MCV RBC AUTO: 98.6 FL (ref 79–97)
MONOCYTES # BLD AUTO: 0.54 10*3/MM3 (ref 0.1–0.9)
MONOCYTES NFR BLD AUTO: 8.2 % (ref 5–12)
NEUTROPHILS NFR BLD AUTO: 3.65 10*3/MM3 (ref 1.7–7)
NEUTROPHILS NFR BLD AUTO: 55.6 % (ref 42.7–76)
NRBC BLD AUTO-RTO: 0 /100 WBC (ref 0–0.2)
PLATELET # BLD AUTO: 246 10*3/MM3 (ref 140–450)
PMV BLD AUTO: 9.7 FL (ref 6–12)
POTASSIUM SERPL-SCNC: 3.8 MMOL/L (ref 3.5–5.2)
PROT SERPL-MCNC: 7.9 G/DL (ref 6–8.5)
QT INTERVAL: 407 MS
QTC INTERVAL: 458 MS
RBC # BLD AUTO: 3.65 10*6/MM3 (ref 3.77–5.28)
SODIUM SERPL-SCNC: 144 MMOL/L (ref 136–145)
TRIGL SERPL-MCNC: 74 MG/DL (ref 0–150)
TROPONIN T SERPL HS-MCNC: 7 NG/L
TSH SERPL DL<=0.05 MIU/L-ACNC: 1.31 UIU/ML (ref 0.27–4.2)
VLDLC SERPL-MCNC: 12 MG/DL (ref 5–40)
WBC NRBC COR # BLD: 6.56 10*3/MM3 (ref 3.4–10.8)

## 2023-09-24 PROCEDURE — 80050 GENERAL HEALTH PANEL: CPT | Performed by: EMERGENCY MEDICINE

## 2023-09-24 PROCEDURE — 80061 LIPID PANEL: CPT | Performed by: STUDENT IN AN ORGANIZED HEALTH CARE EDUCATION/TRAINING PROGRAM

## 2023-09-24 PROCEDURE — 99284 EMERGENCY DEPT VISIT MOD MDM: CPT

## 2023-09-24 PROCEDURE — G0378 HOSPITAL OBSERVATION PER HR: HCPCS

## 2023-09-24 PROCEDURE — 96374 THER/PROPH/DIAG INJ IV PUSH: CPT

## 2023-09-24 PROCEDURE — 25010000002 LORAZEPAM PER 2 MG: Performed by: EMERGENCY MEDICINE

## 2023-09-24 PROCEDURE — 84484 ASSAY OF TROPONIN QUANT: CPT | Performed by: EMERGENCY MEDICINE

## 2023-09-24 PROCEDURE — 83036 HEMOGLOBIN GLYCOSYLATED A1C: CPT | Performed by: STUDENT IN AN ORGANIZED HEALTH CARE EDUCATION/TRAINING PROGRAM

## 2023-09-24 PROCEDURE — 93005 ELECTROCARDIOGRAM TRACING: CPT | Performed by: EMERGENCY MEDICINE

## 2023-09-24 PROCEDURE — 70551 MRI BRAIN STEM W/O DYE: CPT

## 2023-09-24 PROCEDURE — 93010 ELECTROCARDIOGRAM REPORT: CPT | Performed by: INTERNAL MEDICINE

## 2023-09-24 RX ORDER — PANTOPRAZOLE SODIUM 40 MG/1
40 TABLET, DELAYED RELEASE ORAL
Status: DISCONTINUED | OUTPATIENT
Start: 2023-09-25 | End: 2023-09-25 | Stop reason: HOSPADM

## 2023-09-24 RX ORDER — BISACODYL 10 MG
10 SUPPOSITORY, RECTAL RECTAL DAILY PRN
Status: DISCONTINUED | OUTPATIENT
Start: 2023-09-24 | End: 2023-09-25 | Stop reason: HOSPADM

## 2023-09-24 RX ORDER — BISACODYL 5 MG/1
5 TABLET, DELAYED RELEASE ORAL DAILY PRN
Status: DISCONTINUED | OUTPATIENT
Start: 2023-09-24 | End: 2023-09-25 | Stop reason: HOSPADM

## 2023-09-24 RX ORDER — POLYETHYLENE GLYCOL 3350 17 G/17G
17 POWDER, FOR SOLUTION ORAL DAILY PRN
Status: DISCONTINUED | OUTPATIENT
Start: 2023-09-24 | End: 2023-09-25 | Stop reason: HOSPADM

## 2023-09-24 RX ORDER — LORAZEPAM 2 MG/ML
0.5 INJECTION INTRAMUSCULAR ONCE
Status: COMPLETED | OUTPATIENT
Start: 2023-09-24 | End: 2023-09-24

## 2023-09-24 RX ORDER — SODIUM CHLORIDE 0.9 % (FLUSH) 0.9 %
10 SYRINGE (ML) INJECTION EVERY 12 HOURS SCHEDULED
Status: DISCONTINUED | OUTPATIENT
Start: 2023-09-24 | End: 2023-09-25 | Stop reason: HOSPADM

## 2023-09-24 RX ORDER — NITROGLYCERIN 0.4 MG/1
0.4 TABLET SUBLINGUAL
Status: DISCONTINUED | OUTPATIENT
Start: 2023-09-24 | End: 2023-09-25 | Stop reason: HOSPADM

## 2023-09-24 RX ORDER — THIAMINE HCL 100 MG
1 TABLET ORAL DAILY
COMMUNITY

## 2023-09-24 RX ORDER — ONDANSETRON 2 MG/ML
4 INJECTION INTRAMUSCULAR; INTRAVENOUS EVERY 6 HOURS PRN
Status: DISCONTINUED | OUTPATIENT
Start: 2023-09-24 | End: 2023-09-25 | Stop reason: HOSPADM

## 2023-09-24 RX ORDER — AMOXICILLIN 250 MG
2 CAPSULE ORAL 2 TIMES DAILY
Status: DISCONTINUED | OUTPATIENT
Start: 2023-09-24 | End: 2023-09-25 | Stop reason: HOSPADM

## 2023-09-24 RX ORDER — CHOLECALCIFEROL (VITAMIN D3) 125 MCG
5 CAPSULE ORAL NIGHTLY PRN
Status: DISCONTINUED | OUTPATIENT
Start: 2023-09-24 | End: 2023-09-25 | Stop reason: HOSPADM

## 2023-09-24 RX ORDER — ROSUVASTATIN CALCIUM 20 MG/1
10 TABLET, COATED ORAL DAILY
Status: DISCONTINUED | OUTPATIENT
Start: 2023-09-24 | End: 2023-09-25 | Stop reason: HOSPADM

## 2023-09-24 RX ORDER — HYDROXYZINE HYDROCHLORIDE 25 MG/1
50 TABLET, FILM COATED ORAL EVERY 6 HOURS PRN
Status: DISCONTINUED | OUTPATIENT
Start: 2023-09-24 | End: 2023-09-24

## 2023-09-24 RX ORDER — ACETAMINOPHEN 325 MG/1
650 TABLET ORAL EVERY 4 HOURS PRN
Status: DISCONTINUED | OUTPATIENT
Start: 2023-09-24 | End: 2023-09-25 | Stop reason: HOSPADM

## 2023-09-24 RX ORDER — SODIUM CHLORIDE 0.9 % (FLUSH) 0.9 %
10 SYRINGE (ML) INJECTION AS NEEDED
Status: DISCONTINUED | OUTPATIENT
Start: 2023-09-24 | End: 2023-09-25 | Stop reason: HOSPADM

## 2023-09-24 RX ORDER — ONDANSETRON 4 MG/1
4 TABLET, FILM COATED ORAL EVERY 6 HOURS PRN
Status: DISCONTINUED | OUTPATIENT
Start: 2023-09-24 | End: 2023-09-25 | Stop reason: HOSPADM

## 2023-09-24 RX ORDER — SODIUM CHLORIDE 9 MG/ML
40 INJECTION, SOLUTION INTRAVENOUS AS NEEDED
Status: DISCONTINUED | OUTPATIENT
Start: 2023-09-24 | End: 2023-09-25 | Stop reason: HOSPADM

## 2023-09-24 RX ADMIN — Medication 10 ML: at 20:14

## 2023-09-24 RX ADMIN — LORAZEPAM 0.5 MG: 2 INJECTION INTRAMUSCULAR; INTRAVENOUS at 18:14

## 2023-09-24 RX ADMIN — ACETAMINOPHEN 650 MG: 325 TABLET, FILM COATED ORAL at 19:47

## 2023-09-24 RX ADMIN — SENNOSIDES AND DOCUSATE SODIUM 2 TABLET: 50; 8.6 TABLET ORAL at 20:13

## 2023-09-24 NOTE — PLAN OF CARE
Goal Outcome Evaluation:   Pt admitted for intermittent L sided numbness and tingling, quickly resolving before arrival.  Pt verbalizes severe anxiety.  NIH=0.  Awaiting MD assessment and POC.

## 2023-09-24 NOTE — H&P
[facility]   HISTORY AND PHYSICAL    Patient Name: Ayana Silveira  : 1965  MRN: [id number]  Primary Care Physician:  Aureliano Devine Sr., MD  Date of admission: 2023    Subjective   Subjective     Chief Complaint:   Chief Complaint   Patient presents with    Panic Attack         HPI:    Ayana Silveira is a 58 y.o. female with a history of GERD, hyperlipidemia, and anxiety who presents to [facility] ER after multiple episodes of left sided numbness and tingling.  Patient states the first episode happened Thursday morning around 10:00 where she started to have tingling in the left side of her face that descended down her body included her left arm and leg.  She states this lasted about a minute before completely resolving she had a similar episode that lasted 10 seconds on Saturday and then a third today when leaving [facility] that lasted less than a minute.  She had another episode during our evaluation which she reported tingling mainly on the left side of the face around the eye and nose.  Denies headache.  Denies lightheadedness and dizziness.  Denies visual changes such as blurred or double vision denies difficulty with speech or swallowing.  Denies chest pain and shortness of breath.  Denies abdominal pain and nausea.  Denies recent fevers or chills.  Denies tobacco use reports drinking 1-2 drinks daily of alcohol.    Patient was seen in the ER 2023 in which she had a CT head without that showed no acute findings and a carotid Doppler that showed less than 50% stenosis of the right ICA, and no significant stenosis in the left ICA.  Patient was offered admission at that time however declined return to the ER today after after 2 further episodes.  In the ER today lab work fairly unremarkable.    Review of Systems   All systems were reviewed and negative except for: what is mentioned above in the HPI    Personal History     Past Medical History:   Diagnosis Date    Anxiety      Muscle strain of upper extremity 03/01/2021    Peptic ulcer     when she was 18 years old    Post-menopausal     Urinary tract infection June 2021       Past Surgical History:   Procedure Laterality Date    CATARACT EXTRACTION Bilateral     COLONOSCOPY  2014    ORIF ULNAR / RADIAL SHAFT FRACTURE      TONSILLECTOMY         Family History: family history includes Anxiety disorder in her mother and sister; Arthritis in her father; Atrial fibrillation in her mother; Breast cancer in her maternal grandmother and paternal grandmother; Coronary artery disease in her father and mother; Gallbladder disease in her mother; Hyperlipidemia in her brother; Hypertension in her father. Otherwise pertinent FHx was reviewed and not pertinent to current issue.    Social History:  reports that she quit smoking about 40 years ago. Her smoking use included cigarettes. She has never used smokeless tobacco. She reports current alcohol use of about 18.0 standard drinks per week. She reports that she does not use drugs.    Home Medications:  Calcium Citrate-Vitamin D3, Milk Thistle, b complex-C-E-zinc, hydrOXYzine, omeprazole, ondansetron ODT, rosuvastatin, and vitamin E    Allergies:  Allergies   Allergen Reactions    Sulfa Antibiotics Hives    Scallops Nausea And Vomiting       Objective   Objective     Vitals:   Temp:  [98.3 °F (36.8 °C)-98.8 °F (37.1 °C)] 98.8 °F (37.1 °C)  Heart Rate:  [] 72  Resp:  [16] 16  BP: (139-170)/(74-90) 148/74  Physical Exam    Constitutional: 58-year-old female in no acute distress on room air   Eyes: PERRLA, sclerae anicteric, no conjunctival injection, EOMI, visual fields intact, no nystagmus   HENT: NCAT, mucous membranes moist   Neck: Supple, no thyromegaly, no lymphadenopathy, trachea midline   Respiratory: Clear to auscultation bilaterally, nonlabored respirations    Cardiovascular: RRR, no murmurs, rubs, or gallops, palpable pedal pulses bilaterally   Gastrointestinal: Positive bowel sounds,  soft, nontender, nondistended   Musculoskeletal: No bilateral ankle edema, no clubbing or cyanosis to extremities   Psychiatric: Appropriate affect, cooperative   Neurologic: Oriented x 3, strength symmetric in all extremities, Cranial Nerves grossly intact to confrontation, speech clear, no facial droop.  Intermittent decrease sensation along the left face and upper extremity that resolved after less than 1 minute, finger-to-nose exam normal.  No pronator drift noted.   Skin: No rashes     Result Review    Result Review:  I have personally reviewed the results from the time of this admission to 9/24/2023 17:09 EDT and agree with these findings:  [x]  Laboratory list / accordion  []  Microbiology  [x]  Radiology  [x]  EKG/Telemetry   []  Cardiology/Vascular   []  Pathology  [x]  Old records  []  Other:  Most notable findings include:     CT Head Without Contrast    Result Date: 9/21/2023  CT HEAD WITHOUT CONTRAST  HISTORY: Numbness, tingling.  COMPARISON: None.  FINDINGS: The brain ventricles are symmetrical. There is no evidence of hemorrhage, hydrocephalus or of abnormal extra-axial fluid. No focal area of decreased attenuation to suggest acute infarction is identified. Mild to moderate vascular calcification is noted.      There is no evidence of acute infarction or intracranial hemorrhage. Further evaluation could be performed with MRI examination of the brain as indicated.    Radiation dose reduction techniques were utilized, including automated exposure control and exposure modulation based on body size.   This report was finalized on 9/21/2023 2:23 PM by Dr. Jatinder Spann M.D.      Duplex Carotid Ultrasound CAR    Result Date: 9/21/2023    Right internal carotid artery demonstrates a less than 50% stenosis.   Left internal carotid artery demonstrates normal flow without evidence of hemodynamically significant stenosis.          Assessment & Plan   Assessment / Plan     Brief Patient Summary:  Ayana  Jordana is a 58 y.o. female who is being admitted to the observation unit for further evaluation of intermittent episodes of left-sided numbness.  Plan for MRI and neurology consultation.    Active Hospital Problems:  Active Hospital Problems    Diagnosis     **Left sided numbness      Plan:     Left-sided numbness  -CT head on 9/21/2023 shows no acute findings  -MRI of the brain ordered  -Carotid Dopplers on 9/21/2023 shows less than 50% right ICA and no hemodynamically significant left ICA stenosis  -TSH, A1c, lipid panel ordered  -Aspirin/statin  -Neuro consulted  -Neurochecks every 4 hours  -Telemetry monitoring    Hyperlipidemia  -Continue statin    GERD  -PPI    DVT prophylaxis:  Mechanical DVT prophylaxis orders are present.    CODE STATUS:    Code Status (Patient has no pulse and is not breathing): CPR (Attempt to Resuscitate)  Medical Interventions (Patient has pulse or is breathing): Full Support    Admission Status:  I believe this patient meets observation status.    79 minutes have been spent by [facility] providers in the care of this patient while under observation status.      Appropriate PPE worn during patient encounter.  Hand hygeine performed before and after seeing the patient.      Electronically signed by Yael Cook PA-C, 09/24/23, 5:09 PM EDT.

## 2023-09-24 NOTE — ED NOTES
"Nursing report ED to floor  Ayana Silveira  58 y.o.  female    HPI :   Chief Complaint   Patient presents with    Panic Attack       Admitting doctor:   Cecile Briceno MD    Admitting diagnosis:   The primary encounter diagnosis was Stroke-like symptoms. Diagnoses of Facial paresthesia, Paresthesia of left arm and leg, and Mixed hyperlipidemia were also pertinent to this visit.    Code status:   Current Code Status       Date Active Code Status Order ID Comments User Context       Not on file            Allergies:   Sulfa antibiotics    Isolation:   No active isolations    Intake and Output  No intake or output data in the 24 hours ending 09/24/23 1416    Weight:       09/24/23  1230   Weight: 55.3 kg (122 lb)       Most recent vitals:   Vitals:    09/24/23 1230 09/24/23 1245 09/24/23 1341   BP:  170/90 139/86   Pulse: 109  88   Resp: 16  16   Temp: 98.3 °F (36.8 °C)     SpO2: 98%  97%   Weight: 55.3 kg (122 lb)     Height: 167.6 cm (66\")         Active LDAs/IV Access:   Lines, Drains & Airways       Active LDAs       Name Placement date Placement time Site Days    Peripheral IV 09/24/23 1318 Right Antecubital 09/24/23  1318  Antecubital  less than 1                    Labs (abnormal labs have a star):   Labs Reviewed   COMPREHENSIVE METABOLIC PANEL - Abnormal; Notable for the following components:       Result Value    Glucose 101 (*)     Anion Gap 15.1 (*)     All other components within normal limits    Narrative:     GFR Normal >60  Chronic Kidney Disease <60  Kidney Failure <15     CBC WITH AUTO DIFFERENTIAL - Abnormal; Notable for the following components:    RBC 3.65 (*)     MCV 98.6 (*)     MCH 33.7 (*)     RDW 12.1 (*)     All other components within normal limits   SINGLE HSTROPONIN T - Normal    Narrative:     High Sensitive Troponin T Reference Range:  <10.0 ng/L- Negative Female for AMI  <15.0 ng/L- Negative Male for AMI  >=10 - Abnormal Female indicating possible myocardial injury.  >=15 - Abnormal " Male indicating possible myocardial injury.   Clinicians would have to utilize clinical acumen, EKG, Troponin, and serial changes to determine if it is an Acute Myocardial Infarction or myocardial injury due to an underlying chronic condition.        CBC AND DIFFERENTIAL    Narrative:     The following orders were created for panel order CBC & Differential.  Procedure                               Abnormality         Status                     ---------                               -----------         ------                     CBC Auto Differential[[id number]]        Abnormal            Final result                 Please view results for these tests on the individual orders.       EKG:   ECG 12 Lead Stroke Evaluation   Preliminary Result   HEART RATE= 76  bpm   RR Interval= 789  ms   ND Interval= 135  ms   P Horizontal Axis=   deg   P Front Axis= 19  deg   QRSD Interval= 75  ms   QT Interval= 407  ms   QTcB= 458  ms   QRS Axis= 42  deg   T Wave Axis= 38  deg   - OTHERWISE NORMAL ECG -   Sinus rhythm   Abnormal R-wave progression, early transition   Baseline wander in lead(s) V2,V3   Electronically Signed By:    Date and Time of Study: 2023 12:56:36          Meds given in ED:   Medications   sodium chloride 0.9 % flush 10 mL (has no administration in time range)       Imaging results:  No radiology results for the last day    Ambulatory status:   - up at [facility]   Social issues:   Social History     Socioeconomic History    Marital status:      Spouse name: Omid Dewey    Number of children: 3   Tobacco Use    Smoking status: Former     Types: Cigarettes     Quit date: 1983     Years since quittin.7    Smokeless tobacco: Never    Tobacco comments:     just light smoker through college.    Substance and Sexual Activity    Alcohol use: Yes     Alcohol/week: 10.0 standard drinks     Types: 6 Glasses of wine, 4 Drinks containing 0.5 oz of alcohol per week     Comment: one glass of wine per day     Drug use: Never    Sexual activity: Yes     Partners: Male     Birth control/protection: Post-menopausal       NIH Stroke Scale:  Interval: baseline    Sharon Adame RN  09/24/23 14:16 EDT

## 2023-09-24 NOTE — ED PROVIDER NOTES
EMERGENCY DEPARTMENT ENCOUNTER    Room Number:  114/1  PCP: Aureliano Devine Sr., MD  Historian: Patient, spouse      HPI:  Chief Complaint: Left-sided numbness/tingling  A complete HPI/ROS/PMH/PSH/SH/FH are unobtainable due to: Nothing  Context: Ayana Silveira is a 58 y.o. female who presents to the ED c/o intermittent numbness/tingling in her left face, left arm, and left leg.  Patient was seen here in the ED 3 days ago for similar symptoms.  Admission was recommended but the patient preferred to go home and follow-up with her PCP.  Her PCP cannot see her until next week.  Head CT and carotid duplex were unremarkable.  Yesterday, she had a brief episode of numbness/tingling in her left face, left arm, and left leg.  Symptoms lasted about 10 seconds.  She had a similar episode earlier today that also lasted about 10 seconds.  Denies associated headache, vision changes, dizziness, slurred speech, facial droop, chest pain, palpitations, or unilateral weakness.  She is currently asymptomatic.            PAST MEDICAL HISTORY  Active Ambulatory Problems     Diagnosis Date Noted    HLD (hyperlipidemia) 03/01/2021     Resolved Ambulatory Problems     Diagnosis Date Noted    Muscle strain of upper extremity 03/01/2021     Past Medical History:   Diagnosis Date    Anxiety 1990    Peptic ulcer     Post-menopausal     Urinary tract infection June 2021         PAST SURGICAL HISTORY  Past Surgical History:   Procedure Laterality Date    CATARACT EXTRACTION Bilateral     COLONOSCOPY  2014    ORIF ULNAR / RADIAL SHAFT FRACTURE      TONSILLECTOMY           FAMILY HISTORY  Family History   Problem Relation Age of Onset    Breast cancer Maternal Grandmother     Breast cancer Paternal Grandmother     Coronary artery disease Mother         stent    Atrial fibrillation Mother     Gallbladder disease Mother     Anxiety disorder Mother     Arthritis Father     Coronary artery disease Father         s/p stent and cabg     Hypertension  Father     Anxiety disorder Sister     Hyperlipidemia Brother          SOCIAL HISTORY  Social History     Socioeconomic History    Marital status:      Spouse name: Omid Dewey    Number of children: 3   Tobacco Use    Smoking status: Former     Types: Cigarettes     Quit date: 1983     Years since quittin.7    Smokeless tobacco: Never    Tobacco comments:     just light smoker through college.    Vaping Use    Vaping Use: Never used   Substance and Sexual Activity    Alcohol use: Yes     Alcohol/week: 18.0 standard drinks     Types: 14 Glasses of wine, 4 Drinks containing 0.5 oz of alcohol per week     Comment: two glasses of wine per day    Drug use: Never    Sexual activity: Yes     Partners: Male     Birth control/protection: Post-menopausal         ALLERGIES  Sulfa antibiotics and Scallops    REVIEW OF SYSTEMS  All systems have been reviewed and are negative except for those listed in the HPI      PHYSICAL EXAM  ED Triage Vitals [23 1230]   Temp Heart Rate Resp BP SpO2   98.3 °F (36.8 °C) 109 16 -- 98 %      Temp src Heart Rate Source Patient Position BP Location FiO2 (%)   -- -- -- -- --       Physical Exam      GENERAL: Awake, alert, oriented x3.  Well-developed, well-nourished female.  Resting comfortably in no acute distress  HENT: NCAT, nares patent  EYES: PERRL, EOMI  CV: regular rhythm, normal rate  RESPIRATORY: normal effort, clear to auscultation bilaterally  ABDOMEN: soft, nontender  MUSCULOSKELETAL: Extremities are nontender with full range of motion  NEURO: Speech is clear and fluent.  No expressive or receptive aphasia.  Tongue protrudes midline.  No facial droop.  Normal strength and light touch sensation in all extremities.  Normal finger-nose and heel-to-shin testing bilaterally.  Normal extinction testing.  Visual fields are normal upon confrontation.  NIHSS is 0  PSYCH:  calm, cooperative  SKIN: warm, dry    Interval: baseline  1a. Level of Consciousness: 0-->Alert,  keenly responsive  1b. LOC Questions: 0-->Answers both questions correctly  1c. LOC Commands: 0-->Performs both tasks correctly  2. Best Gaze: 0-->Normal  3. Visual: 0-->No visual loss  4. Facial Palsy: 0-->Normal symmetrical movements  5a. Motor Arm, Left: 0-->No drift, limb holds 90 (or 45) degrees for full 10 secs  5b. Motor Arm, Right: 0-->No drift, limb holds 90 (or 45) degrees for full 10 secs  6a. Motor Leg, Left: 0-->No drift, leg holds 30 degree position for full 5 secs  6b. Motor Leg, Right: 0-->No drift, leg holds 30 degree position for full 5 secs  7. Limb Ataxia: 0-->Absent  8. Sensory: 0-->Normal, no sensory loss  9. Best Language: 0-->No aphasia, normal  10. Dysarthria: 0-->Normal  11. Extinction and Inattention (formerly Neglect): 0-->No abnormality    Total (NIH Stroke Scale): 0      Vital signs and nursing notes reviewed.          LAB RESULTS  Recent Results (from the past 24 hour(s))   Comprehensive Metabolic Panel    Collection Time: 09/24/23 12:55 PM    Specimen: Blood   Result Value Ref Range    Glucose 101 (H) 65 - 99 mg/dL    BUN 8 8 - 23 mg/dL    Creatinine 0.75 0.57 - 1.00 mg/dL    Sodium 144 136 - 145 mmol/L    Potassium 3.8 3.5 - 5.2 mmol/L    Chloride 103 98 - 107 mmol/L    CO2 25.9 22.0 - 29.0 mmol/L    Calcium 9.5 8.6 - 10.5 mg/dL    Total Protein 7.9 6.0 - 8.5 g/dL    Albumin 4.7 3.5 - 5.2 g/dL    ALT (SGPT) 14 1 - 33 U/L    AST (SGOT) 18 1 - 32 U/L    Alkaline Phosphatase 39 39 - 117 U/L    Total Bilirubin 0.3 0.0 - 1.2 mg/dL    Globulin 3.2 gm/dL    A/G Ratio 1.5 g/dL    BUN/Creatinine Ratio 10.7 7.0 - 25.0    Anion Gap 15.1 (H) 5.0 - 15.0 mmol/L    eGFR 91.3 >60.0 mL/min/1.73   Single High Sensitivity Troponin T    Collection Time: 09/24/23 12:55 PM    Specimen: Blood   Result Value Ref Range    HS Troponin T 7 <10 ng/L   CBC Auto Differential    Collection Time: 09/24/23 12:55 PM    Specimen: Blood   Result Value Ref Range    WBC 6.56 3.40 - 10.80 10*3/mm3    RBC 3.65 (L) 3.77 -  5.28 10*6/mm3    Hemoglobin 12.3 12.0 - 15.9 g/dL    Hematocrit 36.0 34.0 - 46.6 %    MCV 98.6 (H) 79.0 - 97.0 fL    MCH 33.7 (H) 26.6 - 33.0 pg    MCHC 34.2 31.5 - 35.7 g/dL    RDW 12.1 (L) 12.3 - 15.4 %    RDW-SD 43.9 37.0 - 54.0 fl    MPV 9.7 6.0 - 12.0 fL    Platelets 246 140 - 450 10*3/mm3    Neutrophil % 55.6 42.7 - 76.0 %    Lymphocyte % 34.8 19.6 - 45.3 %    Monocyte % 8.2 5.0 - 12.0 %    Eosinophil % 0.6 0.3 - 6.2 %    Basophil % 0.6 0.0 - 1.5 %    Immature Grans % 0.2 0.0 - 0.5 %    Neutrophils, Absolute 3.65 1.70 - 7.00 10*3/mm3    Lymphocytes, Absolute 2.28 0.70 - 3.10 10*3/mm3    Monocytes, Absolute 0.54 0.10 - 0.90 10*3/mm3    Eosinophils, Absolute 0.04 0.00 - 0.40 10*3/mm3    Basophils, Absolute 0.04 0.00 - 0.20 10*3/mm3    Immature Grans, Absolute 0.01 0.00 - 0.05 10*3/mm3    nRBC 0.0 0.0 - 0.2 /100 WBC   ECG 12 Lead Stroke Evaluation    Collection Time: 09/24/23 12:56 PM   Result Value Ref Range    QT Interval 407 ms    QTC Interval 458 ms       Ordered the above labs and reviewed the results.        RADIOLOGY  No Radiology Exams Resulted Within Past 24 Hours    Ordered the above noted radiological studies. Reviewed by me in PACS.            PROCEDURES  Procedures            MEDICATIONS GIVEN IN ER  Medications   sodium chloride 0.9 % flush 10 mL (has no administration in time range)   sodium chloride 0.9 % flush 10 mL (has no administration in time range)   sodium chloride 0.9 % flush 10 mL (has no administration in time range)   sodium chloride 0.9 % infusion 40 mL (has no administration in time range)   sennosides-docusate (PERICOLACE) 8.6-50 MG per tablet 2 tablet (has no administration in time range)     And   polyethylene glycol (MIRALAX) packet 17 g (has no administration in time range)     And   bisacodyl (DULCOLAX) EC tablet 5 mg (has no administration in time range)     And   bisacodyl (DULCOLAX) suppository 10 mg (has no administration in time range)   ondansetron (ZOFRAN) tablet 4 mg  (has no administration in time range)     Or   ondansetron (ZOFRAN) injection 4 mg (has no administration in time range)   nitroglycerin (NITROSTAT) SL tablet 0.4 mg (has no administration in time range)   melatonin tablet 5 mg (has no administration in time range)   acetaminophen (TYLENOL) tablet 650 mg (has no administration in time range)   hydrOXYzine (ATARAX) tablet 50 mg (has no administration in time range)   pantoprazole (PROTONIX) EC tablet 40 mg (has no administration in time range)   rosuvastatin (CRESTOR) tablet 10 mg (has no administration in time range)   vitamin E capsule 200 Units (has no administration in time range)                   MEDICAL DECISION MAKING, PROGRESS, and CONSULTS    All labs have been independently reviewed by me.  All radiology studies have been reviewed by me and I have also reviewed the radiology report.   EKG's independently viewed and interpreted by me.  Discussion below represents my analysis of pertinent findings related to patient's condition, differential diagnosis, treatment plan and final disposition.      Additional sources:  - Discussed/ obtained information from independent historians: Spouse at bedside    - External (non-ED) record review: Patient saw Dr. Forbes, her PCP, in March 2023 for her annual physical exam.  She has a history of anemia and hyperlipidemia.  Patient was seen here in the ED on 9/21/2023 for left-sided numbness and visual disturbance.  Head CT was negative acute.  Carotid duplex showed less than 50% stenosis in the JESICA and normal flow without stenosis in the LICA.  Admission was recommended at that time but the patient declined.    - Chronic or social conditions impacting care: N/A          Orders placed during this visit:  Orders Placed This Encounter   Procedures    Comprehensive Metabolic Panel    Single High Sensitivity Troponin T    CBC Auto Differential    CBC (No Diff)    Basic Metabolic Panel    Diet: Cardiac Diets; Healthy Heart (2-3  Na+); Texture: Regular Texture (IDDSI 7); Fluid Consistency: Thin (IDDSI 0)    Monitor Blood Pressure    Intake & Output    Weigh Patient    Oral Care    Place Sequential Compression Device    Maintain Sequential Compression Device    Telemetry - Maintain IV Access    Telemetry - Place Orders & Notify Provider of Results When Patient Experiences Acute Chest Pain, Dysrhythmia or Respiratory Distress    May Be Off Telemetry for Tests    Vital Signs    Pulse Oximetry, Continuous    Up With Assistance    Neuro Checks    Notify Provider (With Default Parameters)    Code Status and Medical Interventions:    ECG 12 Lead Stroke Evaluation    Insert Peripheral IV    Insert Peripheral IV    Initiate ED Observation Status    CBC & Differential         Additional orders considered but not ordered:  MRI brain: Patient is being admitted and this can be performed while the patient is hospitalized        Differential diagnosis:    TIA, CVA, seizure, atypical migraine      Independent interpretation of labs, radiology studies, and discussions with consultants:  ED Course as of 09/24/23 1600   Sun Sep 24, 2023   1250 Patient presents to the ED after having 2 episodes of left-sided numbness/tingling.  Denies focal weakness or other neuro symptoms.  She was seen here 3 days ago for a similar episode and admission was recommended.  She preferred outpatient follow-up at that time.  She is now agreeable to being admitted for further evaluation including MRI. [WH]   1330 EKG personally interpreted by me at 1300.  My personal interpretation is:          EKG time: 12:56 PM  Rhythm/Rate: Sinus rhythm, rate 76  P waves and UT: Normal  QRS, axis: Normal axis, early transition  ST and T waves: Normal    Interpreted Contemporaneously by me, independently viewed  EKG is not significantly changed compared to prior EKG done on 2/12/2022   [WH]   1344 HS Troponin T: 7 [WH]   1344 WBC: 6.56 [WH]   1344 Hemoglobin: 12.3 [WH]   1344 Glucose(!): 101 [WH]    1400 Case discussed with KHADIJAH Murdock, and she agrees to admit the patient to Dr. Briceno.  Pertinent history, exam findings, test results, ED course, and diagnoses were discussed with her. []   1421 Patient presented to the ED after having 2 brief episodes of left-sided numbness/tingling.  She was seen here in the ED several days ago for the same symptoms but declined admission at that time.  Today, her stroke score is 0.  She remained asymptomatic in the ED.  She was agreeable with being admitted for further work-up.  She will be admitted to the observation unit. []      ED Course User Index  [] Dheeraj Vazquez MD               DIAGNOSIS  Final diagnoses:   Stroke-like symptoms   Facial paresthesia   Paresthesia of left arm and leg   Mixed hyperlipidemia         DISPOSITION  ADMISSION    Discussed treatment plan and reason for admission with pt/family and admitting physician.  Pt/family voiced understanding of the plan for admission for further testing/treatment as needed.               Latest Documented Vital Signs:  As of 16:00 EDT  BP- 148/74 HR- 72 Temp- 98.8 °F (37.1 °C) (Oral) O2 sat- 100%              --    Please note that portions of this were completed with a voice recognition program.       Note Disclaimer: At [facility], we believe that sharing information builds trust and better relationships. You are receiving this note because you are receiving care at [facility] or recently visited. It is possible you will see health information before a provider has talked with you about it. This kind of information can be easy to misunderstand. To help you fully understand what it means for your health, we urge you to discuss this note with your provider.             Dheeraj Vazquez MD  09/24/23 1600

## 2023-09-25 ENCOUNTER — READMISSION MANAGEMENT (OUTPATIENT)
Dept: CALL CENTER | Facility: HOSPITAL | Age: 58
End: 2023-09-25

## 2023-09-25 VITALS
HEIGHT: 66 IN | WEIGHT: 123 LBS | RESPIRATION RATE: 16 BRPM | TEMPERATURE: 98.1 F | OXYGEN SATURATION: 98 % | HEART RATE: 62 BPM | DIASTOLIC BLOOD PRESSURE: 69 MMHG | BODY MASS INDEX: 19.77 KG/M2 | SYSTOLIC BLOOD PRESSURE: 116 MMHG

## 2023-09-25 LAB
ANION GAP SERPL CALCULATED.3IONS-SCNC: 15 MMOL/L (ref 5–15)
BUN SERPL-MCNC: 9 MG/DL (ref 8–23)
BUN/CREAT SERPL: 12 (ref 7–25)
CALCIUM SPEC-SCNC: 9.5 MG/DL (ref 8.6–10.5)
CHLORIDE SERPL-SCNC: 100 MMOL/L (ref 98–107)
CO2 SERPL-SCNC: 25 MMOL/L (ref 22–29)
CREAT SERPL-MCNC: 0.75 MG/DL (ref 0.57–1)
DEPRECATED RDW RBC AUTO: 41.7 FL (ref 37–54)
EGFRCR SERPLBLD CKD-EPI 2021: 91.3 ML/MIN/1.73
ERYTHROCYTE [DISTWIDTH] IN BLOOD BY AUTOMATED COUNT: 11.7 % (ref 12.3–15.4)
FOLATE SERPL-MCNC: 6.2 NG/ML (ref 4.78–24.2)
GLUCOSE SERPL-MCNC: 105 MG/DL (ref 65–99)
HCT VFR BLD AUTO: 35 % (ref 34–46.6)
HGB BLD-MCNC: 12.1 G/DL (ref 12–15.9)
MCH RBC QN AUTO: 33.6 PG (ref 26.6–33)
MCHC RBC AUTO-ENTMCNC: 34.6 G/DL (ref 31.5–35.7)
MCV RBC AUTO: 97.2 FL (ref 79–97)
PLATELET # BLD AUTO: 236 10*3/MM3 (ref 140–450)
PMV BLD AUTO: 10 FL (ref 6–12)
POTASSIUM SERPL-SCNC: 3.6 MMOL/L (ref 3.5–5.2)
RBC # BLD AUTO: 3.6 10*6/MM3 (ref 3.77–5.28)
SODIUM SERPL-SCNC: 140 MMOL/L (ref 136–145)
VIT B12 BLD-MCNC: 656 PG/ML (ref 211–946)
WBC NRBC COR # BLD: 5.63 10*3/MM3 (ref 3.4–10.8)

## 2023-09-25 PROCEDURE — 82746 ASSAY OF FOLIC ACID SERUM: CPT | Performed by: STUDENT IN AN ORGANIZED HEALTH CARE EDUCATION/TRAINING PROGRAM

## 2023-09-25 PROCEDURE — 80048 BASIC METABOLIC PNL TOTAL CA: CPT | Performed by: STUDENT IN AN ORGANIZED HEALTH CARE EDUCATION/TRAINING PROGRAM

## 2023-09-25 PROCEDURE — G0378 HOSPITAL OBSERVATION PER HR: HCPCS

## 2023-09-25 PROCEDURE — 82607 VITAMIN B-12: CPT | Performed by: STUDENT IN AN ORGANIZED HEALTH CARE EDUCATION/TRAINING PROGRAM

## 2023-09-25 PROCEDURE — 85027 COMPLETE CBC AUTOMATED: CPT | Performed by: STUDENT IN AN ORGANIZED HEALTH CARE EDUCATION/TRAINING PROGRAM

## 2023-09-25 PROCEDURE — 99213 OFFICE O/P EST LOW 20 MIN: CPT | Performed by: STUDENT IN AN ORGANIZED HEALTH CARE EDUCATION/TRAINING PROGRAM

## 2023-09-25 RX ADMIN — ROSUVASTATIN CALCIUM 10 MG: 20 TABLET, FILM COATED ORAL at 08:56

## 2023-09-25 RX ADMIN — SENNOSIDES AND DOCUSATE SODIUM 2 TABLET: 50; 8.6 TABLET ORAL at 08:56

## 2023-09-25 RX ADMIN — Medication 200 UNITS: at 08:56

## 2023-09-25 RX ADMIN — Medication 10 ML: at 08:58

## 2023-09-25 RX ADMIN — PANTOPRAZOLE SODIUM 40 MG: 40 TABLET, DELAYED RELEASE ORAL at 08:56

## 2023-09-25 NOTE — OUTREACH NOTE
Prep Survey      Flowsheet Row Responses   [facility] facility patient discharged from? [place]   Is LACE score < 7 ? Yes   Eligibility [facility]   Date of Admission 09/24/23   Date of Discharge 09/25/23   Discharge Disposition Home or Self Care   Discharge diagnosis left sided numbness - possibly d/t cervical etiology   Does the patient have one of the following disease processes/diagnoses(primary or secondary)? Other   Does the patient have Home health ordered? No   Is there a DME ordered? No   Prep survey completed? Yes            Mary Kate MARIE - Registered Nurse

## 2023-09-25 NOTE — PLAN OF CARE
Goal Outcome Evaluation:      Patient admitted to the observation unit for treatment of left sided numbness that last roughly 1 minute at a time. Las night, patient reported two episodes of feeling numb on her left side. MRI completed overnight. Vss. Neurology consulting. Will continue to monitor for any changes

## 2023-09-25 NOTE — CONSULTS
"Neurology Consult Note    Consult Date: 9/25/2023    Referring MD: No ref. provider found    Reason for Consult I have been asked to see the patient in neurological consultation to render advice and opinion regarding left-sided numbness and tingling    Ayana Silveira is a 58 y.o. female past medical history of GERD, hyperlipidemia, anxiety who presents to the [facility] ER with chief complaints of left-sided numbness and tingling, Neurology has been consulted for the same.  Patient states that since last couple of days on and off the last couple of days she has been having left face and left upper extremity numbness and to a little extent on the left lower leg.  Her first episode was on Thursday when she went to the ER she got a CT head and carotid ultrasound which were negative and she was sent back home.  Sunday while she was at the [employer], started having left face numbness and also left upper extremity numbness without any left leg involvement and she decided to come to the ER to further evaluate and rule out stroke.        Past Medical History:   Diagnosis Date    Anxiety 1990    Muscle strain of upper extremity 03/01/2021    Peptic ulcer     when she was 18 years old    Post-menopausal     Urinary tract infection June 2021       Exam  /69 (BP Location: Left arm, Patient Position: Lying)   Pulse 62   Temp 98.1 °F (36.7 °C) (Oral)   Resp 16   Ht 167.6 cm (66\")   Wt 55.8 kg (123 lb)   LMP  (LMP Unknown)   SpO2 98%   BMI 19.85 kg/m²     General appearance: Well developed, well nourished, well groomed, alert and cooperative.      Higher integrative function: Oriented to time, place, person, intact recent and remote memory, attention span, concentration and language. Spontaneous speech, fund of vocabulary are normal.   CN II: Normal visual acuity   CN III IV VI: Extraocular movements are full without nystagmus. Pupils are equal, round, and reactive to light.   CN V: Sensation same on both " sides of the face  CN VII: Facial movements are symmetric, no weakness.   CN VIII: Auditory acuity is normal.   CN IX & X: Symmetric palatal movement.   CN XI: Sternocleidomastoid and trapezius are normal. No weakness.   CN XII: The tongue is midline. No atrophy or fasciculations.   Motor: Good strength 5/5 both UE and LE B/L both proximal and still muscle groups no pronator drift. No fasciculations, rigidity, spasticity or abnormal movements.   Sensation: Normal sensation to pin prick and light touch all four extremities   2+ reflexes bilateral upper and lower extremities  No Vanessa sign  No upgoing toes  No muscle atrophy  Coordination: Finger to nose test showed no dysmetria      DATA:    Lab Results   Component Value Date    GLUCOSE 105 (H) 09/25/2023    CALCIUM 9.5 09/25/2023     09/25/2023    K 3.6 09/25/2023    CO2 25.0 09/25/2023     09/25/2023    BUN 9 09/25/2023    CREATININE 0.75 09/25/2023    EGFRIFAFRI 114 03/01/2021    EGFRIFNONA 73 02/12/2022    BCR 12.0 09/25/2023    ANIONGAP 15.0 09/25/2023     Lab Results   Component Value Date    WBC 5.63 09/25/2023    HGB 12.1 09/25/2023    HCT 35.0 09/25/2023    MCV 97.2 (H) 09/25/2023     09/25/2023       Lab review:   Sodium 140  Creatinine 0.75  Glucose 105  Normal LFTs    A1c 5.4  TSH 1.3  B12 656                          LDL 1 21                              WBC 5.63                          Hemoglobin 12.1 and platelets 236      Imaging review:   Carotid ultrasound done on 9/21/2023 -bilateral ICAs less than 50% stenosis    MRI brain without-no diffusion restriction changes, T2 flair shows very mild small vessel disease, SWI sequence shows no microhemorrhages.                      Diagnoses:  Left upper extremity and left face numbness    Pre-stroke MRS: 0  NIHSS: NIHSS:    Baseline  0-->Alert: keenly responsive  0-->Answers both questions correctly  0-->Performs both tasks correctly  0=normal  0=No visual loss  0=Normal symmetric  movement  0-->No drift: limb holds 90 (or 45) degrees for full 10 secs  0-->No drift: limb holds 90 (or 45) degrees for full 10 secs  0-->No drift: limb holds 90 (or 45) degrees for full 10 secs  0-->No drift: limb holds 90 (or 45) degrees for full 10 secs  0=Absent  0=Normal; no sensory loss  0=No aphasia, normal  0=Normal  0=No abnormality    Total score: 0    Patient has intermittent left face numbness and left upper extremity numbness, she had an MRI brain and carotid ultrasound which were unremarkable.  I think this numbness and tingling might be related to cervical etiology not really a TIA or a stroke.  She will need an outpatient MRI cervical spine with and without, but she will get it through her PCP and follow-up in neurology clinic in 2 to 3 months.    Stress might be contributing to the symptoms to she has been under tremendous stress in the last 1 week due to her job.    PLAN:   -MRI cervical spine with and without with her PCP  -Follow-up in neurology clinic in 2 to 3 months, will make an appointment.      MDM   Reviewed: Previous charts, nursing notes and vitals   Reviewed: Previous labs and CT scan    Interpretation: Labs and CT scan   Total time providing care is :30-74 minutes. This excluded time spent performing separately reportable procedures and services  Consults :Neurology/Stroke    Lizandro Taylor MD  Neuro Hospitalist /Vascular Neurology.

## 2023-09-25 NOTE — DISCHARGE INSTRUCTIONS
Continue home medications as prescribed.  Keep established follow-up with primary care doctor on October 5, 2023.    Return to the emergency department with worsening symptoms, uncontrolled pain, inability to tolerate oral liquids, fever greater than 101°F not controlled by Tylenol or as needed with emergent concerns.

## 2023-09-25 NOTE — DISCHARGE SUMMARY
.ED OBSERVATION PROGRESS/DISCHARGE SUMMARY    Date of Admission: 9/24/2023   LOS: 0 days   PCP: Aureliano Devine Sr., MD      Subjective   Patient reports she did have another brief episode of the left-sided numbness mainly in the face overnight around 1 AM but has had no further episodes since.  She denies any headache.  Denies lightheadedness and dizziness.  Denies difficulty with her speech or swallowing.  Denies chest pain and shortness of breath.  Denies abdominal pain and nausea.  Denies fevers and chills.    Hospital Outcome:   58-year-old female was seen and examined at bedside following admission to the observation unit due to intermittent left-sided paresthesia.  Laboratory evaluation in the ED relatively unremarkable.  CT head without negative acute and a carotid Doppler that showed less than 50% stenosis of the right ICA, and no significant stenosis in the left ICA. MRI brain with and shows no evidence of acute intracranial findings.   Neurology has seen and evaluated the patient.  No further neurological work-up at this time and recommended for to continue follow-up on an outpatient MRI of the C-spine that have been previously ordered by primary care provider.  Patient has an appointment coming up on 10/5 with her PCP.  Discussed all the findings and plan with the patient who endorses understanding is in agreement.    ROS:  General: no fevers, chills  Respiratory: no cough, dyspnea  Cardiovascular: no chest pain, palpitations  Abdomen: No abdominal pain, nausea, vomiting, or diarrhea  Neurologic: No focal weakness    Objective   Physical Exam:  I have reviewed the vital signs.  Temp:  [97.9 °F (36.6 °C)-98.8 °F (37.1 °C)] 98.1 °F (36.7 °C)  Heart Rate:  [62-88] 62  Resp:  [16] 16  BP: (106-148)/(62-86) 116/69  General Appearance:  58-year-old female in no acute distress on room air  Head:    Normocephalic, atraumatic  Eyes:    Sclerae anicteric  Neck:   Supple, no mass  Lungs: Clear to auscultation  bilaterally, respirations unlabored  Heart: Regular rate and rhythm, S1 and S2 normal, no murmur, rub or gallop  Abdomen:  Soft, non-tender, bowel sounds active, nondistended  Extremities: No clubbing, cyanosis, or edema to lower extremities  Pulses:  2+ and symmetric in distal lower extremities  Skin: No rashes   Neurologic: Oriented x3, Normal strength to extremities, sensation intact, speech clear no facial droop noted.    Results Review:    I have reviewed the labs, radiology results and diagnostic studies.    Results from last 7 days   Lab Units 09/25/23  0453   WBC 10*3/mm3 5.63   HEMOGLOBIN g/dL 12.1   HEMATOCRIT % 35.0   PLATELETS 10*3/mm3 236     Results from last 7 days   Lab Units 09/25/23  0453 09/24/23  1255 09/21/23  1122   SODIUM mmol/L 140 144 140   POTASSIUM mmol/L 3.6 3.8 3.8   CHLORIDE mmol/L 100 103 101   CO2 mmol/L 25.0 25.9 23.3   BUN mg/dL 9 8 12   CREATININE mg/dL 0.75 0.75 0.75   CALCIUM mg/dL 9.5 9.5 9.6   BILIRUBIN mg/dL  --  0.3 0.6   ALK PHOS U/L  --  39 37*   ALT (SGPT) U/L  --  14 12   AST (SGOT) U/L  --  18 16   GLUCOSE mg/dL 105* 101* 91     Imaging Results (Last 24 Hours)       Procedure Component Value Units Date/Time    MRI Brain Without Contrast [[id number]] Collected: 09/24/23 2244     Updated: 09/24/23 2244    Narrative:      MRI BRAIN WO CONTRAST-9/24/2023     HISTORY: Left-sided numbness. Possible stroke.     Multiple noncontrast sagittal and axial images were obtained through the  brain.     The diffusion weighted images show no evidence of acute infarction.  FLAIR images show a few very tiny foci of bright signal intensity in the  bilateral cerebral white matter consistent with minimal small vessel  white matter ischemic disease. No intracranial hemorrhage is seen.  Normal-appearing vascular flow voids are seen. The craniocervical  junction and sella appear normal.       Impression:      1. No evidence of acute infarction or hemorrhage.  2. Very minimal changes of bilateral  small vessel white matter ischemic  disease.     Comment reviewed report               I have reviewed the medications.  ---------------------------------------------------------------------------------------------  Assessment & Plan   Assessment/Problem List    Left sided numbness      Plan:    Left-sided numbness  -CT head on 9/21/2023 shows no acute findings  -MRI of the brain results negative acute  -Carotid Dopplers on 9/21/2023 shows less than 50% right ICA and no hemodynamically significant left ICA stenosis  -TSH 1.3, A1c 5.4, lipid panel notable for  otherwise unremarkable  -B12 and folate within normal limits  -Neurology consulted, no further work-up at this time  -Neurology advised for patient to continue follow-up with PCP, patient has an appointment 10/5 an outpatient order for MRI of the C-spine     Hyperlipidemia  -Continue statin     GERD  -PPI      Disposition: Home    Follow-up after Discharge: PCP    This note will serve as a discharge summary    Yael Cook PA-C 09/25/23 13:22 EDT    I have worn appropriate PPE during this patient encounter, sanitized my hands both with entering and exiting patient's room.    47 minutes has been spent by [facility] providers in the care of this patient while under observation status

## 2023-09-26 ENCOUNTER — TRANSITIONAL CARE MANAGEMENT TELEPHONE ENCOUNTER (OUTPATIENT)
Dept: CALL CENTER | Facility: HOSPITAL | Age: 58
End: 2023-09-26
Payer: COMMERCIAL

## 2023-09-26 NOTE — OUTREACH NOTE
Call Center TCM Note      Flowsheet Row Responses   [facility] patient discharged from? [facility]   Does the patient have one of the following disease processes/diagnoses(primary or secondary)? Other   TCM attempt successful? Yes   Call start time 1437   Call end time 1509   Discharge diagnosis left sided numbness - possibly d/t cervical etiology   Meds reviewed with patient/caregiver? Yes   Is the patient having any side effects they believe may be caused by any medication additions or changes? No   Does the patient have all medications ordered at discharge? N/A   Comments 10/5/2023 10:00 AM  HOSPITAL FOLLOW UP   Does the patient have an appointment with their PCP within 7-14 days of discharge? Yes   Psychosocial issues? No   Comments Pt reports no further left-side numbness anywhere on her body. Pt denies facial droop, difficulty swallowing or w/speech. Pt denies HA, dizziness or any issues since DC.   Did the patient receive a copy of their discharge instructions? Yes   Nursing interventions Reviewed instructions with patient   What is the patient's perception of their health status since discharge? Returned to baseline/stable   Is the patient/caregiver able to teach back signs and symptoms related to disease process for when to call PCP? Yes   Is the patient/caregiver able to teach back the hierarchy of who to call/visit for symptoms/problems? PCP, Specialist, Home health nurse, Urgent Care, ED, 911 Yes   If the patient is a current smoker, are they able to teach back resources for cessation? Not a smoker   Additional teach back comments Reviewed s/sx of stroke and [facility] to [facility], pt verbalized understanding. Pt reports that Neurology reviewed return instructions as well prior to DC home.   TCM call completed? Yes   Call end time 1509   Would this patient benefit from a Referral to [facility] Social Work? No   Is the patient interested in additional calls from an ambulatory case  manager? No            Missy Haro, JANETTE    9/26/2023, 15:10 EDT

## 2023-09-26 NOTE — OUTREACH NOTE
Call Center TCM Note      Flowsheet Row Responses   [facility] patient discharged from? [facility]   Does the patient have one of the following disease processes/diagnoses(primary or secondary)? Other   TCM attempt successful? No  [ VR for PCP]   Unsuccessful attempts Attempt 1   Call Status Left message            Missy Haro RN    2023, 10:07 EDT

## 2023-09-28 ENCOUNTER — TELEPHONE (OUTPATIENT)
Dept: FAMILY MEDICINE CLINIC | Facility: CLINIC | Age: 58
End: 2023-09-28
Payer: COMMERCIAL

## 2023-09-28 DIAGNOSIS — G45.9 TIA (TRANSIENT ISCHEMIC ATTACK): Primary | ICD-10-CM

## 2023-09-28 NOTE — TELEPHONE ENCOUNTER
See msg below from scheduling regarding us order    9/27 - please put in new order for VAS01 Carotid duplex.

## 2023-10-05 ENCOUNTER — TELEPHONE (OUTPATIENT)
Dept: FAMILY MEDICINE CLINIC | Facility: CLINIC | Age: 58
End: 2023-10-05

## 2023-10-05 ENCOUNTER — OFFICE VISIT (OUTPATIENT)
Dept: FAMILY MEDICINE CLINIC | Facility: CLINIC | Age: 58
End: 2023-10-05
Payer: COMMERCIAL

## 2023-10-05 VITALS
OXYGEN SATURATION: 99 % | BODY MASS INDEX: 19.93 KG/M2 | HEART RATE: 70 BPM | SYSTOLIC BLOOD PRESSURE: 116 MMHG | DIASTOLIC BLOOD PRESSURE: 60 MMHG | WEIGHT: 124 LBS | HEIGHT: 66 IN

## 2023-10-05 DIAGNOSIS — F41.9 ANXIETY: ICD-10-CM

## 2023-10-05 DIAGNOSIS — R29.90 STROKE-LIKE SYMPTOMS: Primary | ICD-10-CM

## 2023-10-05 NOTE — PROGRESS NOTES
Chief Complaint  Chief Complaint   Patient presents with    Hospital Follow Up Visit       CC:  Hospital follow-up.     Transitional Care Progress Note        Subjective    History of Present Illness        Ayana Silveira is a 58 y.o. female who presents for a transitional care management visit.    Within 48 business hours after discharge our office contacted her via telephone to coordinate her care and needs.      I reviewed and discussed the details of that call along with the discharge summary, hospital problems, inpatient lab results, inpatient diagnostic studies, and consultation reports with Ayana.     Current outpatient and discharge medications have been reconciled for the patient.  Reviewed by: Aureliano Devine Sr., MD          9/25/2023     5:22 PM   Date of TCM Phone Call   [facility]   Date of Admission 9/24/2023   Date of Discharge 9/25/2023   Discharge Disposition Home or Self Care     Risk for Readmission (LACE) No data recorded      Ayana Silveira presents to [facility] for   History of Present Illness  The patient is a 58-year-old female who presents for a ER follow-up.    ER follow-up  The patient was recently seen in the ER on 09/28/2023 for left-sided tingling. She was sitting outside and suddenly experienced a rush sensation that radiated down her left side. She initially thought she was having a stroke. She did not have any speech issues or other symptoms. She underwent a CT scan and vascular testing which returned normal. She was discharged. On 09/30/2023, she had another mild episode which lasted approximately 10 seconds. She had another episode on 09/30/2023. She presented to the ER again on 10/01/2023 and was kept overnight for observation. An MRI was performed which returned normal. She was then discharged with neurology consult. She has kept a log of her episodes since 09/25/2023. Her last episode was on 09/25/2023 in the middle of the night.  "She woke up feeling weird. She has not had an episode in a few days. She denies paralysis, dizziness, eye sight problems, or strength problems. She has an appointment with neurology on 10/14/2023 with [facility].    Anxiety  The patient feels that her anxiety has returned. She has been managing her anxiety well. She denies panic attacks. She was exhausted that week of her episodes. She had two speaking engagements and had to wake up at 4:00 am on several days. She believes her exhaustion may have contributed to her episodes.    Elevated cholesterol  The patient's cholesterol is elevated. She has a family history of high cholesterol.    Weight loss  The patient has lost 10 pounds since 03/2023. She does not weigh herself often. She has been monitoring her diet.         Objective   Vital Signs:   Visit Vitals  /60 (BP Location: Left arm, Patient Position: Sitting, Cuff Size: Adult)   Pulse 70   Ht 167.6 cm (66\")   Wt 56.2 kg (124 lb)   LMP  (LMP Unknown)   SpO2 99%   BMI 20.01 kg/m²       BMI is within normal parameters. No other follow-up for BMI required.     Physical Exam  Vitals reviewed.   Constitutional:       Appearance: She is well-developed.   HENT:      Head: Normocephalic.      Right Ear: External ear normal.      Left Ear: External ear normal.      Nose: Nose normal.   Eyes:      Conjunctiva/sclera: Conjunctivae normal.   Cardiovascular:      Rate and Rhythm: Normal rate and regular rhythm.   Pulmonary:      Effort: Pulmonary effort is normal.      Breath sounds: Normal breath sounds.   Musculoskeletal:         General: Normal range of motion.      Cervical back: Normal range of motion and neck supple.   Skin:     General: Skin is warm and dry.      Capillary Refill: Capillary refill takes less than 2 seconds.   Neurological:      Mental Status: She is alert and oriented to person, place, and time.                Result Review :                      Assessment and Plan      Diagnoses and all " orders for this visit:    1. Stroke-like symptoms (Primary)  Assessment & Plan:  Hospital records reviewed.  All patient's scans were within normal limits.  Patient has a neurology appointment to follow-up.  No changes to patient's treatments at this time.      2. Anxiety  Assessment & Plan:  Patient strokelike symptoms may come from her anxiety or exhaustion.  Patient was advised to get fully hydrated and rest.               Follow Up   No follow-ups on file.  Patient was given instructions and counseling regarding her condition or for health maintenance advice. Please see specific information pulled into the AVS if appropriate.     Transcribed from ambient dictation for Aureliano Devine Sr, MD by Pascale Bedoya.   10/05/23   12:45 EDT    Patient or patient representative verbalized consent to the visit recording.  I have personally performed the services described in this document as transcribed by the above individual, and it is both accurate and complete.

## 2023-10-05 NOTE — TELEPHONE ENCOUNTER
See msg  below from scheduling regarding us order     9/27 - please put in new order for VAS01 Carotid duplex, we do not do the above test except at [facility]. Thank you

## 2023-10-17 ENCOUNTER — HOSPITAL ENCOUNTER (OUTPATIENT)
Dept: BONE DENSITY | Facility: HOSPITAL | Age: 58
Discharge: HOME | End: 2023-10-17
Admitting: OBSTETRICS & GYNECOLOGY
Payer: COMMERCIAL

## 2023-10-17 DIAGNOSIS — M85.80 OSTEOPENIA, UNSPECIFIED LOCATION: ICD-10-CM

## 2023-10-17 PROCEDURE — 77080 DXA BONE DENSITY AXIAL: CPT

## 2023-10-28 PROBLEM — F41.9 ANXIETY: Status: ACTIVE | Noted: 2023-10-28

## 2023-10-28 PROBLEM — R29.90 STROKE-LIKE SYMPTOMS: Status: ACTIVE | Noted: 2023-10-28

## 2023-10-28 NOTE — ASSESSMENT & PLAN NOTE
Patient strokelike symptoms may come from her anxiety or exhaustion.  Patient was advised to get fully hydrated and rest.

## 2023-10-28 NOTE — ASSESSMENT & PLAN NOTE
Hospital records reviewed.  All patient's scans were within normal limits.  Patient has a neurology appointment to follow-up.  No changes to patient's treatments at this time.

## 2023-10-30 NOTE — TELEPHONE ENCOUNTER
Caller: Ayana Silveira    Relationship: Self    Best call back number: [phone]     Requested Prescriptions:   Requested Prescriptions     Pending Prescriptions Disp Refills    omeprazole (priLOSEC) 40 MG capsule 90 capsule 1     Sig: Take 1 capsule by mouth Daily.        Pharmacy where request should be sent: [facility] [phone] [facility] [address] [address] [facility] [address] AT [facility] [phone] [facility] [phone]      Last office visit with prescribing clinician: 10/5/2023   Last telemedicine visit with prescribing clinician: Visit date not found   Next office visit with prescribing clinician: 3/28/2024     Additional details provided by patient:       PATIENT IS OUT OF MEDICATION    Does the patient have less than a 3 day supply:  [x] Yes  [] No    Would you like a call back once the refill request has been completed: [] Yes [] No    If the office needs to give you a call back, can they leave a voicemail: [] Yes [] No    David Brown Rep   10/30/23 15:29 EDT

## 2023-10-31 RX ORDER — OMEPRAZOLE 40 MG/1
40 CAPSULE, DELAYED RELEASE ORAL DAILY
Qty: 90 CAPSULE | Refills: 1 | Status: SHIPPED | OUTPATIENT
Start: 2023-10-31

## 2023-11-07 NOTE — TELEPHONE ENCOUNTER
Caller: Ayana Silveira    Relationship: Self    Best call back number: [phone]     Requested Prescriptions:   Requested Prescriptions     Pending Prescriptions Disp Refills    omeprazole (priLOSEC) 40 MG capsule 90 capsule 1     Sig: Take 1 capsule by mouth Daily.    rosuvastatin (CRESTOR) 10 MG tablet 90 tablet 3     Sig: Take 1 tablet by mouth every night at bedtime.        Pharmacy where request should be sent: [facility] [id number] [facility] [address] [address] AT [facility] [phone] [facility] [phone]      Last office visit with prescribing clinician: 10/5/2023   Last telemedicine visit with prescribing clinician: Visit date not found   Next office visit with prescribing clinician: 3/28/2024     Additional details provided by patient: PATIENT SWITCHED PHARMACIES AND IS NEEDING THIS SCRIPTS TO BE SENT TO THIS NEW PHARMACY. PHARMACY IS HAVING A HARD TIME TRANSFERRING.     Does the patient have less than a 3 day supply:  [x] Yes  [] No    Would you like a call back once the refill request has been completed: [] Yes [] No    If the office needs to give you a call back, can they leave a voicemail: [] Yes [] No    David Marcano Rep   11/07/23 12:46 EST

## 2023-11-07 NOTE — TELEPHONE ENCOUNTER
Rx Refill Note  Requested Prescriptions     Pending Prescriptions Disp Refills    omeprazole (priLOSEC) 40 MG capsule 90 capsule 1     Sig: Take 1 capsule by mouth Daily.    rosuvastatin (CRESTOR) 10 MG tablet 90 tablet 3     Sig: Take 1 tablet by mouth every night at bedtime.      Last office visit with prescribing clinician: 10/5/2023   Last telemedicine visit with prescribing clinician: Visit date not found   Next office visit with prescribing clinician: 3/28/2024                         Would you like a call back once the refill request has been completed: [] Yes [] No    If the office needs to give you a call back, can they leave a voicemail: [] Yes [] No    Elliott Henson MA  11/07/23, 13:24 EST

## 2023-11-08 DIAGNOSIS — K21.9 GASTROESOPHAGEAL REFLUX DISEASE WITHOUT ESOPHAGITIS: Primary | ICD-10-CM

## 2023-11-08 RX ORDER — OMEPRAZOLE 40 MG/1
40 CAPSULE, DELAYED RELEASE ORAL DAILY
Qty: 90 CAPSULE | Refills: 3 | Status: SHIPPED | OUTPATIENT
Start: 2023-11-08 | End: 2023-11-08 | Stop reason: SDUPTHER

## 2023-11-08 RX ORDER — OMEPRAZOLE 40 MG/1
40 CAPSULE, DELAYED RELEASE ORAL DAILY
Qty: 90 CAPSULE | Refills: 3 | Status: SHIPPED | OUTPATIENT
Start: 2023-11-08

## 2023-11-08 RX ORDER — ROSUVASTATIN CALCIUM 10 MG/1
10 TABLET, COATED ORAL
Qty: 90 TABLET | Refills: 3 | Status: SHIPPED | OUTPATIENT
Start: 2023-11-08

## 2023-11-16 ENCOUNTER — OFFICE VISIT (OUTPATIENT)
Dept: OBSTETRICS AND GYNECOLOGY | Facility: CLINIC | Age: 58
End: 2023-11-16
Payer: COMMERCIAL

## 2023-11-16 VITALS
DIASTOLIC BLOOD PRESSURE: 83 MMHG | WEIGHT: 123 LBS | HEIGHT: 66 IN | BODY MASS INDEX: 19.77 KG/M2 | SYSTOLIC BLOOD PRESSURE: 148 MMHG

## 2023-11-16 DIAGNOSIS — N95.1 MENOPAUSAL STATE: ICD-10-CM

## 2023-11-16 DIAGNOSIS — Z01.419 ENCOUNTER FOR GYNECOLOGICAL EXAMINATION WITHOUT ABNORMAL FINDING: Primary | ICD-10-CM

## 2023-11-16 DIAGNOSIS — M85.80 OSTEOPENIA, UNSPECIFIED LOCATION: ICD-10-CM

## 2023-11-16 NOTE — PROGRESS NOTES
Subjective    Chief Complaint   Patient presents with    Gynecologic Exam     AE, Mammo 2023      History of Present Illness    Ayana Silveira is a 58 y.o. female who presents for annual exam.  Non-smoker.  Mammogram May 2023 normal.  DEXA scan 2023 osteopenia.  Colonoscopy due  after 10  years.  Patient will check with her gastroenterologist.  No periods and no problems.    Obstetric History:  OB History          3    Para   3    Term   3            AB        Living             SAB        IAB        Ectopic        Molar        Multiple        Live Births                   Menstrual History:     No LMP recorded (lmp unknown). Patient is postmenopausal.       Past Medical History:   Diagnosis Date    Anxiety     Muscle strain of upper extremity 2021    Peptic ulcer     when she was 18 years old    Post-menopausal     Urinary tract infection 2021     Family History   Problem Relation Age of Onset    Breast cancer Maternal Grandmother     Breast cancer Paternal Grandmother     Coronary artery disease Mother         stent    Atrial fibrillation Mother     Gallbladder disease Mother     Anxiety disorder Mother     Arthritis Father     Coronary artery disease Father         s/p stent and cabg     Hypertension Father     Anxiety disorder Sister     Hyperlipidemia Brother      Social History     Tobacco Use   Smoking Status Former    Types: Cigarettes    Quit date: 1983    Years since quittin.9   Smokeless Tobacco Never   Tobacco Comments    just light smoker through college.          The following portions of the patient's history were reviewed and updated as appropriate: allergies, current medications, past family history, past medical history, past social history, past surgical history, and problem list.    Review of Systems   Constitutional: Negative.  Negative for fever and unexpected weight change.   HENT: Negative.     Respiratory:  Negative for shortness of  breath and wheezing.    Cardiovascular:  Negative for chest pain, palpitations and leg swelling.   Gastrointestinal:  Negative for abdominal pain, anal bleeding and blood in stool.   Genitourinary:  Negative for dysuria, pelvic pain, urgency, vaginal bleeding, vaginal discharge and vaginal pain.   Skin: Negative.    Neurological: Negative.    Hematological: Negative.  Negative for adenopathy.   Psychiatric/Behavioral: Negative.  Negative for dysphoric mood. The patient is not nervous/anxious.             Objective   Physical Exam  Exam conducted with a chaperone present.   Constitutional:       Appearance: Normal appearance. She is well-developed.   HENT:      Head: Normocephalic.   Neck:      Thyroid: No thyromegaly.      Trachea: Trachea normal. No tracheal deviation.   Cardiovascular:      Rate and Rhythm: Normal rate and regular rhythm.      Heart sounds: Normal heart sounds. No murmur heard.  Pulmonary:      Effort: Pulmonary effort is normal.      Breath sounds: Normal breath sounds.   Chest:   Breasts:     Right: Normal. No mass, nipple discharge or tenderness.      Left: Normal. No mass, nipple discharge or tenderness.   Abdominal:      Palpations: Abdomen is soft. There is no mass.      Tenderness: There is no abdominal tenderness.      Hernia: No hernia is present.   Genitourinary:     General: Normal vulva.      Labia:         Right: No tenderness or lesion.         Left: No tenderness or lesion.       Urethra: No prolapse or urethral lesion.      Vagina: Normal. No vaginal discharge or lesions.      Cervix: No cervical motion tenderness.      Uterus: Not enlarged and not tender.       Adnexa:         Right: No mass or tenderness.          Left: No mass or tenderness.        Rectum: Normal. No external hemorrhoid or internal hemorrhoid. Normal anal tone.      Comments: External genitalia normal   Lymphadenopathy:      Cervical: No cervical adenopathy.      Upper Body:      Right upper body: No axillary  "adenopathy.      Left upper body: No axillary adenopathy.   Skin:     General: Skin is warm and dry.      Findings: No rash.   Neurological:      Mental Status: She is alert and oriented to person, place, and time.   Psychiatric:         Behavior: Behavior normal.         /83   Ht 167.6 cm (66\")   Wt 55.8 kg (123 lb)   LMP  (LMP Unknown)   BMI 19.85 kg/m²     Assessment & Plan   Diagnoses and all orders for this visit:    1. Encounter for gynecological examination without abnormal finding (Primary)  -     IGP,rfx Aptima HPV All Pth    2. Osteopenia, unspecified location    3. Menopausal state        Mammogram.  Due in May.  Return to office 1 year.  Colorectal screening up-to-date but colonoscopy due next year.  Patient does take calcium with vitamin D for osteoporosis prevention             "

## 2023-12-11 NOTE — PROGRESS NOTES
DOS: 2023  NAME: Ayana Silveira   : 1965  PCP: Aureliano Devine Sr., MD    CC: left sided numbness/tingling   Referring MD: No ref. provider found    Neurological Problem:  58 y.o. right-handed female with a Hx of anxiety who presents today for hospital follow-up for left-sided numbness and tingling.  Patient and problem are new to me.  She is not coming by any family.  History provided by patient and review of records as summarized below.    Interval History:  Ms. Silveira presented to [facility] on 2023 with complaints of left-sided numbness and tingling.  She states the morning of the her symptoms happened she had actually spoken at a conference and had been getting ready for that for several days and had been overly stressed and not sleeping well at all.  She noticed that she had the sudden flushing sensation of numbness and tingling of the left side of her face that then gradually spread down to her arm and leg.  She initially presented to the hospital and had a CT head and carotid ultrasound which were negative for any acute findings.  She was discharged home but then a few days later while leaving [facility] she again started to have the left face becoming numb and tingly but then spread to her arm and leg therefore she came back to the hospital.  She had no weakness, speech or visual disturbances, trouble with balance, or headache.  She states she occasionally gets headaches but they are not too severe. She underwent a MRI brain without that revealed no evidence of acute infarction or hemorrhage.  She states her blood pressure was elevated the first time she presented to the hospital but not the second time.  She was evaluated by our stroke neurologist Dr. Taylor who felt that her symptoms may have been related to a cervical etiology and not stroke or TIA.  He recommended an outpatient MRI cervical spine with and without through her PCP and follow-up in the outpatient clinic.   "She was discharged home.    She presents today and reports that she has not had an episode since September 30.  She states at that time she was driving and again had similar symptoms of a flushing sensation of her face and then developed left-sided numbness and tingling that spread down to her leg.  It lasted around 10 seconds and then resolved.  She states these of her episodes have lasted 30 seconds or less.  She did follow-up with her PCP and per review of their note they felt that these episodes were related to her anxiety.  She states she has had anxiety since she was a teenager and previously suffered major anxiety but had been doing better.  Currently she is not on any treatment for her anxiety symptoms but was instructed to try and stay fully hydrated and rest more.  She did not undergo the MRI C-spine.  She denies any neck pain.  No weakness of her upper extremities.    Review of Systems:        Review of Systems    \"The following portions of the patient's history were reviewed and updated as appropriate: allergies, current medications, past family history, past medical history, past social history, past surgical history and problem list.\"    Review and Interpretation of Imaging as described in interval history.    Laboratory Results:             Lab Results   Component Value Date    HGBA1C 5.40 09/24/2023     Lab Results   Component Value Date    CHOL 263 (H) 09/24/2023     Lab Results   Component Value Date     (H) 09/24/2023    HDL 94 (H) 03/23/2023     03/18/2022     Lab Results   Component Value Date     (H) 09/24/2023     (H) 03/23/2023     (H) 03/18/2022     Lab Results   Component Value Date    TRIG 74 09/24/2023    TRIG 122 03/23/2023    TRIG 149 03/18/2022     No components found for: \"CHOLHDL\"  No results found for: \"RPR\"  Lab Results   Component Value Date    TSH 1.310 09/24/2023     Lab Results   Component Value Date    ECYHKEAX14 656 09/25/2023     Lab " "Results   Component Value Date    GLUCOSE 105 (H) 09/25/2023    BUN 9 09/25/2023    CREATININE 0.75 09/25/2023    EGFRIFNONA 73 02/12/2022    EGFRIFAFRI 114 03/01/2021    BCR 12.0 09/25/2023    K 3.6 09/25/2023    CO2 25.0 09/25/2023    CALCIUM 9.5 09/25/2023    PROTENTOTREF 7.5 03/23/2023    ALBUMIN 4.7 09/24/2023    LABIL2 1.7 03/23/2023    AST 18 09/24/2023    ALT 14 09/24/2023     Lab Results   Component Value Date    WBC 5.63 09/25/2023    HGB 12.1 09/25/2023    HCT 35.0 09/25/2023    MCV 97.2 (H) 09/25/2023     09/25/2023     No results found for: \"INR\", \"PROTIME\"    Physical Examination:   mRS:   General Appearance:   Well developed, well nourished, well groomed, alert, and cooperative.  HEENT: Normocephalic. Atraumatic.   Extremities:    No obvious edema.  Respiratory:   Even and unlabored.    Neurological examination:  Higher Integrative  Function: Oriented to time, place, and person. Normal registration, recall, attention span and concentration. Normal language including comprehension, spontaneous speech, repetition, and vocabulary. No neglect with normal visual-spatial function and construction. Normal fund of knowledge and higher integrative function.  CN II: Normal visual fields.    CN III IV VI: Extraocular movements are full without nystagmus.   CN V: Normal facial sensation and strength of muscles of mastication.  CN VII: Facial movements are symmetric. No weakness.  CN IX & X:   Symmetric palatal movement.  CN XI: Sternocleidomastoid and trapezius are normal.  No weakness.  CN XII:   The tongue is midline.  No atrophy or fasciculations.  Motor: Normal muscle strength, bulk and tone in upper and lower extremities.  No fasciculations, rigidity, spasticity, or abnormal movements.  Sensation: Normal to light touch, vibration, temperature, in arms and legs.  Station and Gait: Normal gait and station.  Normal arm swing.  Coordination: Finger to nose test shows no dysmetria.  Rapid alternating " movements are normal.      Impression/Plan:    Ms. Silveira presents today as a hospital follow-up for left-sided numbness and tingling episodes.  She had workup including MRI brain without, carotid duplex, and a noncontrasted CT head that were unrevealing for source of symptoms.  She had expressed severe stress around the time that her symptoms started and does have a history of generalized anxiety but is currently not being treated.  She has had no recurrent episodes since September.  I think that for now we can hold off on her spinal imaging but if symptoms recur and not in the setting of a stressful situation I would recommend she undergo the MRI C-spine with and without and potentially repeat the MRI brain with contrast.  She is agreeable to this plan.  I also discussed that migraine headaches can also present like this at times.  We discussed the importance of staying hydrated, getting plenty of physical activity and sleep, and considering treatment for her anxiety.  She will follow-up with me as needed.    Diagnoses and all orders for this visit:    1. Paresthesias (Primary)    2. Generalized anxiety disorder    3. Hospital discharge follow-up        MDM  Reviewed: previous chart and vitals  Reviewed previous: labs, MRI and CT scan  Interpretation: labs, MRI and CT scan    I spent 45 minutes caring for patient on todays date of service. This time includes time spent by me in the following activities: obtaining and/or reviewing a separately obtained history, performing a medically appropriate examination and/or evaluation, counseling and educating the patient on diagnosis and treatment, and documenting information in the medical record.        MORE Lara

## 2023-12-14 ENCOUNTER — OFFICE VISIT (OUTPATIENT)
Dept: NEUROLOGY | Facility: CLINIC | Age: 58
End: 2023-12-14
Payer: COMMERCIAL

## 2023-12-14 VITALS
HEIGHT: 66 IN | SYSTOLIC BLOOD PRESSURE: 130 MMHG | OXYGEN SATURATION: 97 % | HEART RATE: 67 BPM | BODY MASS INDEX: 19.93 KG/M2 | WEIGHT: 124 LBS | DIASTOLIC BLOOD PRESSURE: 74 MMHG

## 2023-12-14 DIAGNOSIS — F41.1 GENERALIZED ANXIETY DISORDER: ICD-10-CM

## 2023-12-14 DIAGNOSIS — R20.2 PARESTHESIAS: Primary | ICD-10-CM

## 2023-12-14 DIAGNOSIS — Z09 HOSPITAL DISCHARGE FOLLOW-UP: ICD-10-CM

## 2023-12-14 NOTE — LETTER
2023       No Recipients    Patient: Ayana Silveira   YOB: 1965   Date of Visit: 2023     Dear Aureliano Devine Sr., MD:       Thank you for referring Ayana Silveira to me for evaluation. Below are the relevant portions of my assessment and plan of care.    If you have questions, please do not hesitate to call me. I look forward to following Ayana along with you.         Sincerely,        MORE Lara        CC:   No Recipients    Kimberly Roy APRN  [id number] [phone]  Sign when Signing Visit  DOS: 2023  NAME: Ayana Silveira   : 1965  PCP: Aureliano Devine Sr., MD    CC: left sided numbness/tingling   Referring MD: No ref. provider found    Neurological Problem:  58 y.o. right-handed female with a Hx of anxiety who presents today for hospital follow-up for left-sided numbness and tingling.  Patient and problem are new to me.  She is not coming by any family.  History provided by patient and review of records as summarized below.    Interval History:  Ms. Silveira presented to [facility] on 2023 with complaints of left-sided numbness and tingling.  She states the morning of the her symptoms happened she had actually spoken at a conference and had been getting ready for that for several days and had been overly stressed and not sleeping well at all.  She noticed that she had the sudden flushing sensation of numbness and tingling of the left side of her face that then gradually spread down to her arm and leg.  She initially presented to the hospital and had a CT head and carotid ultrasound which were negative for any acute findings.  She was discharged home but then a few days later while leaving [facility] she again started to have the left face becoming numb and tingly but then spread to her arm and leg therefore she came back to the hospital.  She had no weakness, speech or visual disturbances, trouble with balance, or headache.  She states she  "occasionally gets headaches but they are not too severe. She underwent a MRI brain without that revealed no evidence of acute infarction or hemorrhage.  She states her blood pressure was elevated the first time she presented to the hospital but not the second time.  She was evaluated by our stroke neurologist Dr. Taylor who felt that her symptoms may have been related to a cervical etiology and not stroke or TIA.  He recommended an outpatient MRI cervical spine with and without through her PCP and follow-up in the outpatient clinic.  She was discharged home.    She presents today and reports that she has not had an episode since September 30.  She states at that time she was driving and again had similar symptoms of a flushing sensation of her face and then developed left-sided numbness and tingling that spread down to her leg.  It lasted around 10 seconds and then resolved.  She states these of her episodes have lasted 30 seconds or less.  She did follow-up with her PCP and per review of their note they felt that these episodes were related to her anxiety.  She states she has had anxiety since she was a teenager and previously suffered major anxiety but had been doing better.  Currently she is not on any treatment for her anxiety symptoms but was instructed to try and stay fully hydrated and rest more.  She did not undergo the MRI C-spine.  She denies any neck pain.  No weakness of her upper extremities.    Review of Systems:        Review of Systems    \"The following portions of the patient's history were reviewed and updated as appropriate: allergies, current medications, past family history, past medical history, past social history, past surgical history and problem list.\"    Review and Interpretation of Imaging as described in interval history.    Laboratory Results:             Lab Results   Component Value Date    HGBA1C 5.40 09/24/2023     Lab Results   Component Value Date    CHOL 263 (H) 09/24/2023 " "    Lab Results   Component Value Date     (H) 09/24/2023    HDL 94 (H) 03/23/2023     03/18/2022     Lab Results   Component Value Date     (H) 09/24/2023     (H) 03/23/2023     (H) 03/18/2022     Lab Results   Component Value Date    TRIG 74 09/24/2023    TRIG 122 03/23/2023    TRIG 149 03/18/2022     No components found for: \"CHOLHDL\"  No results found for: \"RPR\"  Lab Results   Component Value Date    TSH 1.310 09/24/2023     Lab Results   Component Value Date    YEFIGJMY52 656 09/25/2023     Lab Results   Component Value Date    GLUCOSE 105 (H) 09/25/2023    BUN 9 09/25/2023    CREATININE 0.75 09/25/2023    EGFRIFNONA 73 02/12/2022    EGFRIFAFRI 114 03/01/2021    BCR 12.0 09/25/2023    K 3.6 09/25/2023    CO2 25.0 09/25/2023    CALCIUM 9.5 09/25/2023    PROTENTOTREF 7.5 03/23/2023    ALBUMIN 4.7 09/24/2023    LABIL2 1.7 03/23/2023    AST 18 09/24/2023    ALT 14 09/24/2023     Lab Results   Component Value Date    WBC 5.63 09/25/2023    HGB 12.1 09/25/2023    HCT 35.0 09/25/2023    MCV 97.2 (H) 09/25/2023     09/25/2023     No results found for: \"INR\", \"PROTIME\"    Physical Examination:   mRS:   General Appearance:   Well developed, well nourished, well groomed, alert, and cooperative.  HEENT: Normocephalic. Atraumatic.   Extremities:    No obvious edema.  Respiratory:   Even and unlabored.    Neurological examination:  Higher Integrative  Function: Oriented to time, place, and person. Normal registration, recall, attention span and concentration. Normal language including comprehension, spontaneous speech, repetition, and vocabulary. No neglect with normal visual-spatial function and construction. Normal fund of knowledge and higher integrative function.  CN II: Normal visual fields.    CN III IV VI: Extraocular movements are full without nystagmus.   CN V: Normal facial sensation and strength of muscles of mastication.  CN VII: Facial movements are symmetric. No " weakness.  CN IX & X:   Symmetric palatal movement.  CN XI: Sternocleidomastoid and trapezius are normal.  No weakness.  CN XII:   The tongue is midline.  No atrophy or fasciculations.  Motor: Normal muscle strength, bulk and tone in upper and lower extremities.  No fasciculations, rigidity, spasticity, or abnormal movements.  Sensation: Normal to light touch, vibration, temperature, in arms and legs.  Station and Gait: Normal gait and station.  Normal arm swing.  Coordination: Finger to nose test shows no dysmetria.  Rapid alternating movements are normal.      Impression/Plan:    Ms. Silveira presents today as a hospital follow-up for left-sided numbness and tingling episodes.  She had workup including MRI brain without, carotid duplex, and a noncontrasted CT head that were unrevealing for source of symptoms.  She had expressed severe stress around the time that her symptoms started and does have a history of generalized anxiety but is currently not being treated.  She has had no recurrent episodes since September.  I think that for now we can hold off on her spinal imaging but if symptoms recur and not in the setting of a stressful situation I would recommend she undergo the MRI C-spine with and without and potentially repeat the MRI brain with contrast.  She is agreeable to this plan.  I also discussed that migraine headaches can also present like this at times.  We discussed the importance of staying hydrated, getting plenty of physical activity and sleep, and considering treatment for her anxiety.  She will follow-up with me as needed.    Diagnoses and all orders for this visit:    1. Paresthesias (Primary)    2. Generalized anxiety disorder    3. Hospital discharge follow-up        MDM  Reviewed: previous chart and vitals  Reviewed previous: labs, MRI and CT scan  Interpretation: labs, MRI and CT scan    I spent 45 minutes caring for patient on todays date of service. This time includes time spent by me in the  following activities: obtaining and/or reviewing a separately obtained history, performing a medically appropriate examination and/or evaluation, counseling and educating the patient on diagnosis and treatment, and documenting information in the medical record.        Kimberly Roy APRN

## 2024-01-12 ENCOUNTER — TELEPHONE (OUTPATIENT)
Dept: FAMILY MEDICINE CLINIC | Facility: CLINIC | Age: 59
End: 2024-01-12
Payer: COMMERCIAL

## 2024-01-12 DIAGNOSIS — Z12.11 SCREENING FOR COLON CANCER: Primary | ICD-10-CM

## 2024-01-12 NOTE — TELEPHONE ENCOUNTER
Caller: Ayana Silveira    Relationship: Self    Best call back number: [phone]     What is the medical concern/diagnosis: ROUTINE SCREENING    What specialty or service is being requested: COLONOSCOPY    What is the provider, practice or medical service name: DR. LOPEZ    What is the office location: [address] [place] [address]    What is the office phone number: [phone]    Any additional details: NONE.

## 2024-01-31 ENCOUNTER — TELEPHONE (OUTPATIENT)
Dept: GASTROENTEROLOGY | Facility: CLINIC | Age: 59
End: 2024-01-31
Payer: COMMERCIAL

## 2024-01-31 NOTE — TELEPHONE ENCOUNTER
LAST C/S 8/21/14 IN [facility]    NO PERSONAL HX OF POLYPS    NO FAMILY HX OF POLYPS    NO FAMILY HX OF COLON CA    NO ASA OR BLOOD THINNERS        LIST OF  MEDICATIONS    Calcium Citrate-Vitamin D3 (CITRACAL) 315-6.25 MG-MCG tablet tablet  MILK THISTLE PO  Multiple Vitamins-Minerals (b complex-C-E-zinc) tablet  omeprazole (priLOSEC) 40 MG capsule  rosuvastatin (CRESTOR) 10 MG tablet  vitamin E 200 UNIT capsule             OA QUESTIONNAIRE SCANNED IN MEDIA

## 2024-02-01 DIAGNOSIS — Z12.11 SCREENING FOR COLORECTAL CANCER: Primary | ICD-10-CM

## 2024-02-01 DIAGNOSIS — Z12.12 SCREENING FOR COLORECTAL CANCER: Primary | ICD-10-CM

## 2024-02-02 ENCOUNTER — TELEPHONE (OUTPATIENT)
Dept: GASTROENTEROLOGY | Facility: CLINIC | Age: 59
End: 2024-02-02
Payer: COMMERCIAL

## 2024-02-02 PROBLEM — Z12.12 SCREENING FOR COLORECTAL CANCER: Status: ACTIVE | Noted: 2024-02-01

## 2024-02-02 PROBLEM — Z12.11 SCREENING FOR COLORECTAL CANCER: Status: ACTIVE | Noted: 2024-02-01

## 2024-02-02 NOTE — TELEPHONE ENCOUNTER
Ok hub to read trevon hobbs set up colonoscopy for 5/17/24 to arrive at 1:30 my charted miralax prep info

## 2024-03-13 ENCOUNTER — TELEPHONE (OUTPATIENT)
Dept: GASTROENTEROLOGY | Facility: CLINIC | Age: 59
End: 2024-03-13

## 2024-03-13 NOTE — TELEPHONE ENCOUNTER
Hub staff attempted to follow warm transfer process and was unsuccessful     Caller: Ayana Silveira    Relationship to patient: Self    Best call back number: [phone] (Mobile)    Patient is needing: PT IS NEEDING TO RESCHED PROCEDURE IN MAY. PLEASE CALL HER TO RESCHED,

## 2024-03-28 ENCOUNTER — OFFICE VISIT (OUTPATIENT)
Dept: FAMILY MEDICINE CLINIC | Facility: CLINIC | Age: 59
End: 2024-03-28
Payer: COMMERCIAL

## 2024-03-28 VITALS
DIASTOLIC BLOOD PRESSURE: 72 MMHG | WEIGHT: 123 LBS | TEMPERATURE: 97.3 F | BODY MASS INDEX: 19.77 KG/M2 | HEART RATE: 78 BPM | SYSTOLIC BLOOD PRESSURE: 130 MMHG | OXYGEN SATURATION: 98 % | RESPIRATION RATE: 17 BRPM | HEIGHT: 66 IN

## 2024-03-28 DIAGNOSIS — E78.2 MIXED HYPERLIPIDEMIA: ICD-10-CM

## 2024-03-28 DIAGNOSIS — Z00.00 ANNUAL PHYSICAL EXAM: Primary | ICD-10-CM

## 2024-03-28 DIAGNOSIS — K21.9 GASTROESOPHAGEAL REFLUX DISEASE WITHOUT ESOPHAGITIS: ICD-10-CM

## 2024-03-28 PROCEDURE — 99396 PREV VISIT EST AGE 40-64: CPT | Performed by: FAMILY MEDICINE

## 2024-03-28 RX ORDER — OMEPRAZOLE 40 MG/1
40 CAPSULE, DELAYED RELEASE ORAL DAILY
Qty: 30 CAPSULE | Refills: 11 | Status: SHIPPED | OUTPATIENT
Start: 2024-03-28

## 2024-03-28 RX ORDER — ROSUVASTATIN CALCIUM 10 MG/1
10 TABLET, COATED ORAL
Qty: 30 TABLET | Refills: 11 | Status: SHIPPED | OUTPATIENT
Start: 2024-03-28

## 2024-03-28 NOTE — PROGRESS NOTES
"Chief Complaint  Chief Complaint   Patient presents with    Annual Exam     Physical       Subjective    History of Present Illness        Ayana Silveira presents to [facility] for   History of Present Illness  Ayana Silveira is a 58 y.o. female here for her annual physical with me. Ayana is here for coordination of medical care, to discuss health maintenance, disease prevention as well as to followup on medical problems. Patient has been followed by me since 2023. Patient's last CPE was 2023. Activity level is moderate. Exercises 3 per week. Appetite is good. Feels well with none complaints. Energy level is good. Sleeps fairly well. Patient's last colonoscopy was 2014. She is advised to repeat in 10 years. Patient's last mammogram was 2023.        Objective   Vital Signs:   Visit Vitals  /72   Pulse 78   Temp 97.3 °F (36.3 °C)   Resp 17   Ht 167.6 cm (66\")   Wt 55.8 kg (123 lb)   LMP  (LMP Unknown)   SpO2 98%   BMI 19.85 kg/m²          BMI is within normal parameters. No other follow-up for BMI required.     Physical Exam  Vitals reviewed.   Constitutional:       Appearance: Normal appearance. She is well-developed and normal weight.   HENT:      Head: Normocephalic and atraumatic.      Right Ear: Tympanic membrane, ear canal and external ear normal.      Left Ear: Tympanic membrane, ear canal and external ear normal.      Nose: Nose normal.      Mouth/Throat:      Mouth: Mucous membranes are moist.      Pharynx: Oropharynx is clear.   Eyes:      General: Lids are normal.      Conjunctiva/sclera: Conjunctivae normal.      Pupils: Pupils are equal, round, and reactive to light.   Cardiovascular:      Rate and Rhythm: Normal rate and regular rhythm.      Pulses: Normal pulses.      Heart sounds: Normal heart sounds.   Pulmonary:      Effort: Pulmonary effort is normal. No respiratory distress.      Breath sounds: Normal breath sounds.   Abdominal:      General: Bowel " sounds are normal.      Palpations: Abdomen is soft.   Musculoskeletal:         General: Normal range of motion.      Cervical back: Normal range of motion and neck supple.   Skin:     General: Skin is warm and dry.      Capillary Refill: Capillary refill takes less than 2 seconds.   Neurological:      General: No focal deficit present.      Mental Status: She is alert and oriented to person, place, and time. Mental status is at baseline.   Psychiatric:         Mood and Affect: Mood normal.         Behavior: Behavior normal.         Thought Content: Thought content normal.         Judgment: Judgment normal.            Result Review :                    Assessment and Plan      Diagnoses and all orders for this visit:    1. Annual physical exam (Primary)  Assessment & Plan:  Discussed injury prevention, diet and exercise, safe sexual practices, and screening for common diseases. Encouraged use of sunscreen and seatbelts. Discussed timing of  cervical cancer screening. Encouraged monthly self-breast exams, yearly clinical breast exams, and discussed timing of mammograms. Avoidance of tobacco encouraged. Limitation or avoidance of alcohol encouraged. Recommend yearly dental and eye exams. Also discussed monitoring of blood pressure, lipids.      2. Gastroesophageal reflux disease without esophagitis  -     omeprazole (priLOSEC) 40 MG capsule; Take 1 capsule by mouth Daily.  Dispense: 30 capsule; Refill: 11    3. Mixed hyperlipidemia  -     rosuvastatin (CRESTOR) 10 MG tablet; Take 1 tablet by mouth every night at bedtime.  Dispense: 30 tablet; Refill: 11             Follow Up   No follow-ups on file.  Patient was given instructions and counseling regarding her condition or for health maintenance advice. Please see specific information pulled into the AVS if appropriate.

## 2024-07-26 ENCOUNTER — TELEPHONE (OUTPATIENT)
Dept: GASTROENTEROLOGY | Facility: CLINIC | Age: 59
End: 2024-07-26
Payer: COMMERCIAL

## 2024-07-26 NOTE — TELEPHONE ENCOUNTER
Provider: DR KEISHA LOPEZ     Caller: SEAN BOSS     Relationship to Patient: SELF     Phone Number: [phone]     Reason for Call: PT IS NEEDING PREP INSTRUCTIONS AND ARRIVAL TIME TO COLONOSCOPY ON 8/2/24 PLEASE ADVISE AND SEND INFORMATION

## 2024-07-30 ENCOUNTER — TELEPHONE (OUTPATIENT)
Dept: GASTROENTEROLOGY | Facility: CLINIC | Age: 59
End: 2024-07-30
Payer: COMMERCIAL

## 2024-08-02 ENCOUNTER — ANESTHESIA (OUTPATIENT)
Dept: GASTROENTEROLOGY | Facility: HOSPITAL | Age: 59
End: 2024-08-02
Payer: COMMERCIAL

## 2024-08-02 ENCOUNTER — ANESTHESIA EVENT (OUTPATIENT)
Dept: GASTROENTEROLOGY | Facility: HOSPITAL | Age: 59
End: 2024-08-02
Payer: COMMERCIAL

## 2024-08-02 ENCOUNTER — HOSPITAL ENCOUNTER (OUTPATIENT)
Facility: HOSPITAL | Age: 59
Setting detail: HOSPITAL OUTPATIENT SURGERY
Discharge: HOME | End: 2024-08-02
Attending: INTERNAL MEDICINE | Admitting: INTERNAL MEDICINE
Payer: COMMERCIAL

## 2024-08-02 VITALS
BODY MASS INDEX: 19.75 KG/M2 | WEIGHT: 122.9 LBS | DIASTOLIC BLOOD PRESSURE: 77 MMHG | HEART RATE: 67 BPM | RESPIRATION RATE: 16 BRPM | SYSTOLIC BLOOD PRESSURE: 115 MMHG | HEIGHT: 66 IN | OXYGEN SATURATION: 97 %

## 2024-08-02 PROCEDURE — S0260 H&P FOR SURGERY: HCPCS | Performed by: INTERNAL MEDICINE

## 2024-08-02 PROCEDURE — 25810000003 LACTATED RINGERS PER 1000 ML: Performed by: INTERNAL MEDICINE

## 2024-08-02 PROCEDURE — 45378 DIAGNOSTIC COLONOSCOPY: CPT | Performed by: INTERNAL MEDICINE

## 2024-08-02 PROCEDURE — 25010000002 PROPOFOL 10 MG/ML EMULSION: Performed by: NURSE ANESTHETIST, CERTIFIED REGISTERED

## 2024-08-02 RX ORDER — SODIUM CHLORIDE 0.9 % (FLUSH) 0.9 %
10 SYRINGE (ML) INJECTION AS NEEDED
Status: DISCONTINUED | OUTPATIENT
Start: 2024-08-02 | End: 2024-08-02 | Stop reason: HOSPADM

## 2024-08-02 RX ORDER — SODIUM CHLORIDE, SODIUM LACTATE, POTASSIUM CHLORIDE, CALCIUM CHLORIDE 600; 310; 30; 20 MG/100ML; MG/100ML; MG/100ML; MG/100ML
30 INJECTION, SOLUTION INTRAVENOUS CONTINUOUS PRN
Status: DISCONTINUED | OUTPATIENT
Start: 2024-08-02 | End: 2024-08-02 | Stop reason: HOSPADM

## 2024-08-02 RX ORDER — SODIUM CHLORIDE 9 MG/ML
40 INJECTION, SOLUTION INTRAVENOUS AS NEEDED
Status: DISCONTINUED | OUTPATIENT
Start: 2024-08-02 | End: 2024-08-02 | Stop reason: HOSPADM

## 2024-08-02 RX ORDER — PROPOFOL 10 MG/ML
VIAL (ML) INTRAVENOUS AS NEEDED
Status: DISCONTINUED | OUTPATIENT
Start: 2024-08-02 | End: 2024-08-02 | Stop reason: SURG

## 2024-08-02 RX ORDER — LIDOCAINE HYDROCHLORIDE 20 MG/ML
INJECTION, SOLUTION INFILTRATION; PERINEURAL AS NEEDED
Status: DISCONTINUED | OUTPATIENT
Start: 2024-08-02 | End: 2024-08-02 | Stop reason: SURG

## 2024-08-02 RX ORDER — SODIUM CHLORIDE 0.9 % (FLUSH) 0.9 %
10 SYRINGE (ML) INJECTION EVERY 12 HOURS SCHEDULED
Status: DISCONTINUED | OUTPATIENT
Start: 2024-08-02 | End: 2024-08-02 | Stop reason: HOSPADM

## 2024-08-02 RX ADMIN — PROPOFOL 30 MG: 10 INJECTION, EMULSION INTRAVENOUS at 07:38

## 2024-08-02 RX ADMIN — PROPOFOL 50 MG: 10 INJECTION, EMULSION INTRAVENOUS at 07:36

## 2024-08-02 RX ADMIN — PROPOFOL 30 MG: 10 INJECTION, EMULSION INTRAVENOUS at 07:41

## 2024-08-02 RX ADMIN — PROPOFOL 30 MG: 10 INJECTION, EMULSION INTRAVENOUS at 07:44

## 2024-08-02 RX ADMIN — LIDOCAINE HYDROCHLORIDE 60 MG: 20 INJECTION, SOLUTION INFILTRATION; PERINEURAL at 07:34

## 2024-08-02 RX ADMIN — PROPOFOL 50 MG: 10 INJECTION, EMULSION INTRAVENOUS at 07:35

## 2024-08-02 RX ADMIN — PROPOFOL 30 MG: 10 INJECTION, EMULSION INTRAVENOUS at 07:47

## 2024-08-02 RX ADMIN — SODIUM CHLORIDE, POTASSIUM CHLORIDE, SODIUM LACTATE AND CALCIUM CHLORIDE 30 ML/HR: 600; 310; 30; 20 INJECTION, SOLUTION INTRAVENOUS at 07:24

## 2024-08-02 RX ADMIN — PROPOFOL 30 MG: 10 INJECTION, EMULSION INTRAVENOUS at 07:50

## 2024-08-02 RX ADMIN — PROPOFOL 50 MG: 10 INJECTION, EMULSION INTRAVENOUS at 07:34

## 2024-08-02 NOTE — DISCHARGE INSTRUCTIONS
For the next 24 hours patient needs to be with a responsible adult.    For 24 hours DO NOT drive, operate machinery, appliances, drink alcohol, make important decisions or sign legal documents.    Start with a light or bland diet and advance to regular diet as tolerated.    Follow recommendations on procedure report provided by your doctor.    Call Dr Salas for problems [phone]    Problems may include but not limited to: large amounts of bleeding, trouble breathing, repeated vomiting, severe unrelieved pain, fever or chills.

## 2024-08-02 NOTE — ANESTHESIA PREPROCEDURE EVALUATION
Anesthesia Evaluation     Patient summary reviewed and Nursing notes reviewed   NPO Solid Status: > 8 hours  NPO Liquid Status: > 4 hours           Airway   Mallampati: II  Neck ROM: full  No difficulty expected  Dental - normal exam     Pulmonary     breath sounds clear to auscultation  (+) a smoker Former,  Cardiovascular     Rhythm: regular    (+) hyperlipidemia      Neuro/Psych  (+) numbness, psychiatric history Anxiety  GI/Hepatic/Renal/Endo    (+) PUD    Musculoskeletal     Abdominal    Substance History      OB/GYN          Other                    Anesthesia Plan    ASA 2     MAC     intravenous induction     Anesthetic plan, risks, benefits, and alternatives have been provided, discussed and informed consent has been obtained with: patient.    CODE STATUS:

## 2024-08-02 NOTE — ANESTHESIA POSTPROCEDURE EVALUATION
"Patient: Ayana Silveira    Procedure Summary       Date: 08/02/24 Room / Location: [facility] ENDOSCOPY 1 /  [facility]    Anesthesia Start: 0729 Anesthesia Stop: 0756    Procedure: COLONOSCOPY TO CECUM/TI Diagnosis:       Tortuous colon      Hemorrhoids      (Screening for colorectal cancer [Z12.11, Z12.12])    Surgeons: Jeronimo Salas MD Provider: Randy Alva MD    Anesthesia Type: MAC ASA Status: 2            Anesthesia Type: MAC    Vitals  Vitals Value Taken Time   /77 08/02/24 0815   Temp     Pulse 67 08/02/24 0817   Resp 16 08/02/24 0815   SpO2 97 % 08/02/24 0817   Vitals shown include unfiled device data.        Post Anesthesia Care and Evaluation    Patient location during evaluation: bedside  Patient participation: complete - patient participated  Level of consciousness: sleepy but conscious  Pain score: 0  Pain management: adequate    Airway patency: patent  Anesthetic complications: No anesthetic complications    Cardiovascular status: acceptable  Respiratory status: acceptable  Hydration status: acceptable    Comments: /77 (BP Location: Left arm, Patient Position: Lying)   Pulse 67   Resp 16   Ht 167.6 cm (66\")   Wt 55.7 kg (122 lb 14.4 oz)   LMP  (LMP Unknown)   SpO2 97%   BMI 19.84 kg/m²       "

## 2024-08-13 ENCOUNTER — PATIENT ROUNDING (BHMG ONLY) (OUTPATIENT)
Dept: URGENT CARE | Facility: CLINIC | Age: 59
End: 2024-08-13
Payer: COMMERCIAL

## 2024-08-13 NOTE — ED NOTES
Thank you for letting us care for you at your recent visit to [facility] [place]. We would love to hear about your experience with us. Was this the first time you have visited our location?    We’re always looking for ways to make our patients’ experience even better. Do you have any recommendations on ways we may improve?     Please be on the lookout for a survey about your recent visit from Chyna Chang via text or email. We would greatly appreciate if you could fill that out and turn it back in. We want your voice to be heard and we value your feedback.     Thank you for choosing [facility] for your healthcare needs. I appreciate you taking the time to respond!

## 2024-09-26 ENCOUNTER — TELEPHONE (OUTPATIENT)
Dept: FAMILY MEDICINE CLINIC | Facility: CLINIC | Age: 59
End: 2024-09-26
Payer: COMMERCIAL

## 2024-09-26 DIAGNOSIS — E78.2 MIXED HYPERLIPIDEMIA: Primary | ICD-10-CM

## 2024-10-24 ENCOUNTER — OFFICE VISIT (OUTPATIENT)
Dept: FAMILY MEDICINE CLINIC | Facility: CLINIC | Age: 59
End: 2024-10-24
Payer: COMMERCIAL

## 2024-10-24 VITALS
RESPIRATION RATE: 18 BRPM | BODY MASS INDEX: 19.61 KG/M2 | OXYGEN SATURATION: 97 % | HEART RATE: 77 BPM | SYSTOLIC BLOOD PRESSURE: 122 MMHG | WEIGHT: 122 LBS | HEIGHT: 66 IN | DIASTOLIC BLOOD PRESSURE: 72 MMHG

## 2024-10-24 DIAGNOSIS — E78.2 MIXED HYPERLIPIDEMIA: Primary | ICD-10-CM

## 2024-10-24 DIAGNOSIS — K21.9 GASTROESOPHAGEAL REFLUX DISEASE WITHOUT ESOPHAGITIS: ICD-10-CM

## 2024-10-24 PROCEDURE — 99214 OFFICE O/P EST MOD 30 MIN: CPT | Performed by: FAMILY MEDICINE

## 2024-10-24 RX ORDER — OMEPRAZOLE 40 MG/1
40 CAPSULE, DELAYED RELEASE ORAL DAILY
Qty: 90 CAPSULE | Refills: 3 | Status: SHIPPED | OUTPATIENT
Start: 2024-10-24

## 2024-10-24 NOTE — PROGRESS NOTES
"Chief Complaint  Chief Complaint   Patient presents with    Hyperlipidemia     6 mon f/u        Subjective    History of Present Illness        Ayana Silveira presents to [facility] for   Hyperlipidemia  This is a chronic problem. The current episode started more than 1 year ago. The problem is uncontrolled. Recent lipid tests were reviewed and are high. She has no history of hypothyroidism or obesity. Pertinent negatives include no chest pain, focal weakness or myalgias. Current antihyperlipidemic treatment includes diet change and exercise. The current treatment provides no improvement of lipids. Risk factors for coronary artery disease include post-menopausal, family history and dyslipidemia.   Heartburn  She reports no chest pain. This is a chronic problem. The current episode started more than 1 year ago. The problem has been unchanged. The symptoms are aggravated by stress and certain foods. Pertinent negatives include no melena, muscle weakness or weight loss. She has tried a PPI for the symptoms. The treatment provided significant relief.      History of Present Illness      Objective   Vital Signs:   Visit Vitals  /72   Pulse 77   Resp 18   Ht 167.6 cm (66\")   Wt 55.3 kg (122 lb)   LMP  (LMP Unknown)   SpO2 97%   BMI 19.69 kg/m²          BMI is within normal parameters. No other follow-up for BMI required.     Physical Exam  Vitals reviewed.   Constitutional:       Appearance: She is well-developed.   HENT:      Head: Normocephalic.      Right Ear: External ear normal.      Left Ear: External ear normal.      Nose: Nose normal.   Eyes:      Conjunctiva/sclera: Conjunctivae normal.   Cardiovascular:      Rate and Rhythm: Normal rate and regular rhythm.   Pulmonary:      Effort: Pulmonary effort is normal.      Breath sounds: Normal breath sounds.   Abdominal:      General: Abdomen is flat. Bowel sounds are normal.      Palpations: Abdomen is soft.      Tenderness: " There is no abdominal tenderness.   Musculoskeletal:         General: Normal range of motion.      Cervical back: Normal range of motion and neck supple.   Skin:     General: Skin is warm and dry.      Capillary Refill: Capillary refill takes less than 2 seconds.   Neurological:      Mental Status: She is alert and oriented to person, place, and time.        Physical Exam           Result Review :  Results                            Assessment and Plan      Diagnoses and all orders for this visit:    1. Mixed hyperlipidemia (Primary)  Assessment & Plan:   Lipid abnormalities are worsening    Plan:  Continue same medication/s without change.      Discussed medication dosage, use, side effects, and goals of treatment in detail.    Counseled patient on lifestyle modifications to help control hyperlipidemia.     Patient Treatment Goals:   LDL goal is under 100    Followup in 6 months.      2. Gastroesophageal reflux disease without esophagitis  Assessment & Plan:  Stable.  Patient was given a prescription for omeprazole to help treat her symptoms.  Patient was encouraged to return to clinic in 6 months for follow-up.    Orders:  -     omeprazole (priLOSEC) 40 MG capsule; Take 1 capsule by mouth Daily.  Dispense: 90 capsule; Refill: 3       Assessment & Plan             Follow Up   No follow-ups on file.  Patient was given instructions and counseling regarding her condition or for health maintenance advice. Please see specific information pulled into the AVS if appropriate.     Do not like us do not like us do not like's

## 2024-11-01 NOTE — ASSESSMENT & PLAN NOTE
Stable.  Patient was given a prescription for omeprazole to help treat her symptoms.  Patient was encouraged to return to clinic in 6 months for follow-up.

## 2024-11-21 ENCOUNTER — PROCEDURE VISIT (OUTPATIENT)
Dept: OBSTETRICS AND GYNECOLOGY | Facility: CLINIC | Age: 59
End: 2024-11-21
Payer: COMMERCIAL

## 2024-11-21 ENCOUNTER — OFFICE VISIT (OUTPATIENT)
Dept: OBSTETRICS AND GYNECOLOGY | Facility: CLINIC | Age: 59
End: 2024-11-21
Payer: COMMERCIAL

## 2024-11-21 VITALS
BODY MASS INDEX: 20.09 KG/M2 | SYSTOLIC BLOOD PRESSURE: 138 MMHG | DIASTOLIC BLOOD PRESSURE: 82 MMHG | WEIGHT: 125 LBS | HEIGHT: 66 IN

## 2024-11-21 DIAGNOSIS — Z78.0 POSTMENOPAUSE: ICD-10-CM

## 2024-11-21 DIAGNOSIS — Z01.419 WOMEN'S ANNUAL ROUTINE GYNECOLOGICAL EXAMINATION: Primary | ICD-10-CM

## 2024-11-21 DIAGNOSIS — Z12.31 VISIT FOR SCREENING MAMMOGRAM: Primary | ICD-10-CM

## 2024-11-21 NOTE — PROGRESS NOTES
GYN Annual Exam     Chief Complaint   Patient presents with    Gynecologic Exam     Pt here today for AE, Mammo today        Ayana Silveira is a 59 y.o. female who presents for annual well woman exam. She is postmenopausal. She denies vaginal spotting or discharge. She completes SBE monthly. Prior care with Dr. Carrillo. She had a new grandson delivery just last night!    OB History          3    Para   3    Term   3            AB        Living             SAB        IAB        Ectopic        Molar        Multiple        Live Births                  Mammogram: today  Dexa scan:  osteopenia with low frax  Colonoscopy:  rpt due   Last Pap :  NIL. Last HPV testing negative in   History of abnormal Pap smear: ASCUS, HPV negative   Family history of uterine, colon or ovarian cancer: no  Family history of breast cancer: yes - MGM and PGM  History of abnormal mammogram: no    Menopause:  Bleeding since? no  Vasomotor symptoms: no  HRT: no  Dyspareunia: no    Past Medical History:   Diagnosis Date    Anxiety 1990    GERD (gastroesophageal reflux disease)     History of COVID-19     Hyperlipidemia     Muscle strain of upper extremity 2021    Peptic ulcer     when she was 18 years old    Post-menopausal     Screening for colorectal cancer 2024    Urinary tract infection 2021       Past Surgical History:   Procedure Laterality Date    CATARACT EXTRACTION Bilateral     COLONOSCOPY      COLONOSCOPY N/A 2024    Procedure: COLONOSCOPY TO CECUM/TI;  Surgeon: Jeronimo Salas MD;  Location: [facility];  Service: Gastroenterology;  Laterality: N/A;  SCREENING  POST: HEMORRHOIDS    ORIF ULNAR / RADIAL SHAFT FRACTURE      TONSILLECTOMY           Current Outpatient Medications:     Calcium Citrate-Vitamin D3 (CITRACAL) 315-6.25 MG-MCG tablet tablet, Take 1 tablet by mouth Daily., Disp: , Rfl:     MILK THISTLE PO, Take  by mouth., Disp: , Rfl:     Multiple  Vitamins-Minerals (b complex-C-E-zinc) tablet, Take 1 tablet by mouth Daily., Disp: , Rfl:     omeprazole (priLOSEC) 40 MG capsule, Take 1 capsule by mouth Daily., Disp: 90 capsule, Rfl: 3    rosuvastatin (CRESTOR) 10 MG tablet, Take 1 tablet by mouth every night at bedtime., Disp: 30 tablet, Rfl: 11    vitamin E 200 UNIT capsule, Take 1 capsule by mouth Daily., Disp: , Rfl:     Allergies   Allergen Reactions    Sulfa Antibiotics Hives    Scallops Nausea And Vomiting       Social History     Tobacco Use    Smoking status: Former     Current packs/day: 0.00     Types: Cigarettes     Quit date: 1983     Years since quittin.9    Smokeless tobacco: Never    Tobacco comments:     just light smoker through college.    Vaping Use    Vaping status: Never Used   Substance Use Topics    Alcohol use: Yes     Alcohol/week: 18.0 standard drinks of alcohol     Types: 14 Glasses of wine, 4 Drinks containing 0.5 oz of alcohol per week     Comment: two glasses of wine per day    Drug use: Never       Family History   Problem Relation Age of Onset    Breast cancer Maternal Grandmother     Breast cancer Paternal Grandmother     Coronary artery disease Mother         stent    Atrial fibrillation Mother     Gallbladder disease Mother     Anxiety disorder Mother     Arthritis Father     Coronary artery disease Father         s/p stent and cabg     Hypertension Father     Anxiety disorder Sister     Hyperlipidemia Brother        Review of Systems   Constitutional:  Negative for chills, fatigue and fever.   Gastrointestinal:  Negative for abdominal distention, abdominal pain, nausea and vomiting.   Endocrine: Negative for cold intolerance and heat intolerance.   Genitourinary:  Negative for dyspareunia, dysuria, menstrual problem, pelvic pain, vaginal bleeding, vaginal discharge and vaginal pain.   Musculoskeletal:  Negative for gait problem.   Skin:  Negative for rash.   Neurological:  Negative for dizziness and headaches.  "  Hematological:  Does not bruise/bleed easily.   Psychiatric/Behavioral:  Negative for behavioral problems.        /82   Ht 167.6 cm (66\")   Wt 56.7 kg (125 lb)   LMP  (LMP Unknown)   BMI 20.18 kg/m²     Physical Exam  Constitutional:       General: She is not in acute distress.     Appearance: Normal appearance. She is not ill-appearing, toxic-appearing or diaphoretic.   Genitourinary:      Vulva, bladder and urethral meatus normal.      No lesions in the vagina.      Right Labia: No rash, tenderness, lesions, skin changes or Bartholin's cyst.     Left Labia: No tenderness, lesions, skin changes, Bartholin's cyst or rash.     No labial fusion noted.      No inguinal adenopathy present in the right or left side.     No vaginal discharge, erythema, tenderness, bleeding or ulceration.      No vaginal prolapse present.     Mild vaginal atrophy present.       Right Adnexa: not tender, not full, not palpable, no mass present and not absent.     Left Adnexa: not tender, not full, not palpable, no mass present and not absent.     No cervical motion tenderness, discharge, friability, lesion, polyp, nabothian cyst or eversion.      Uterus is not enlarged, fixed, tender, irregular or prolapsed.      No uterine mass detected.     No urethral tenderness or mass present.      Pelvic exam was performed with patient in the lithotomy position.   Breasts:     Breasts are symmetrical.      Right: Present. No swelling, bleeding, inverted nipple, mass, nipple discharge, skin change, tenderness or breast implant.      Left: Present. No swelling, bleeding, inverted nipple, mass, nipple discharge, skin change, tenderness or breast implant.   HENT:      Head: Normocephalic and atraumatic.   Eyes:      Pupils: Pupils are equal, round, and reactive to light.   Cardiovascular:      Rate and Rhythm: Normal rate.   Pulmonary:      Effort: Pulmonary effort is normal.   Abdominal:      General: There is no distension.      Palpations: " Abdomen is soft. There is no mass.      Tenderness: There is no abdominal tenderness. There is no guarding.      Hernia: No hernia is present. There is no hernia in the left inguinal area or right inguinal area.   Musculoskeletal:         General: Normal range of motion.      Cervical back: Normal range of motion and neck supple. No tenderness.   Lymphadenopathy:      Cervical: No cervical adenopathy.      Upper Body:      Right upper body: No supraclavicular, axillary or pectoral adenopathy.      Left upper body: No supraclavicular, axillary or pectoral adenopathy.      Lower Body: No right inguinal adenopathy. No left inguinal adenopathy.   Neurological:      General: No focal deficit present.      Mental Status: She is alert and oriented to person, place, and time.      Cranial Nerves: No cranial nerve deficit.   Skin:     General: Skin is warm and dry.   Psychiatric:         Mood and Affect: Mood normal.         Behavior: Behavior normal.         Thought Content: Thought content normal.         Judgment: Judgment normal.   Vitals and nursing note reviewed.       Assessment    Diagnoses and all orders for this visit:    1. Women's annual routine gynecological examination (Primary)  -     Cancel: IGP, Apt HPV,rfx 16 / 18,45  -     IGP, Rfx Aptima HPV ASCU    2. Postmenopause       Plan     1) Breast Health - Clinical breast exam & mammogram yearly, Self breast awareness. Schedule screening mammogram.   2) Pap - collected  3) STD-no  4) Smoking status- former smoker  5) Colon health - screening colonoscopy recommended if not up to date  6) BMI is within normal parameters. No other follow-up for BMI required.  7) Bone health - Weight bearing exercise, dietary calcium recommendations and vitamin D  reviewed.   8) Encouraged 150 minutes of exercise per week if not medically contraindicated    Return in about 1 year (around 11/21/2025) for Annual physical, MORE [place].    MORE Hull  11/21/2024  10:59 EST

## 2024-11-30 LAB
CONV .: NORMAL
CYTOLOGIST CVX/VAG CYTO: NORMAL
CYTOLOGY CVX/VAG DOC CYTO: NORMAL
CYTOLOGY CVX/VAG DOC THIN PREP: NORMAL
DX ICD CODE: NORMAL
Lab: NORMAL
OTHER STN SPEC: NORMAL
STAT OF ADQ CVX/VAG CYTO-IMP: NORMAL

## 2025-04-02 ENCOUNTER — TELEPHONE (OUTPATIENT)
Dept: FAMILY MEDICINE CLINIC | Facility: CLINIC | Age: 60
End: 2025-04-02
Payer: COMMERCIAL

## 2025-04-02 DIAGNOSIS — Z00.00 ANNUAL PHYSICAL EXAM: Primary | ICD-10-CM

## 2025-04-03 DIAGNOSIS — Z00.00 ANNUAL PHYSICAL EXAM: ICD-10-CM

## 2025-04-03 LAB
ALBUMIN SERPL-MCNC: 4.4 G/DL (ref 3.5–5.2)
ALBUMIN/GLOB SERPL: 1.6 G/DL
ALP SERPL-CCNC: 47 U/L (ref 39–117)
ALT SERPL-CCNC: 16 U/L (ref 1–33)
AST SERPL-CCNC: 22 U/L (ref 1–32)
BASOPHILS # BLD AUTO: 0.04 10*3/MM3 (ref 0–0.2)
BASOPHILS NFR BLD AUTO: 0.8 % (ref 0–1.5)
BILIRUB SERPL-MCNC: 0.5 MG/DL (ref 0–1.2)
BUN SERPL-MCNC: 9 MG/DL (ref 8–23)
BUN/CREAT SERPL: 11.5 (ref 7–25)
CALCIUM SERPL-MCNC: 10 MG/DL (ref 8.6–10.5)
CHLORIDE SERPL-SCNC: 101 MMOL/L (ref 98–107)
CHOLEST SERPL-MCNC: 260 MG/DL (ref 0–200)
CO2 SERPL-SCNC: 28.2 MMOL/L (ref 22–29)
CREAT SERPL-MCNC: 0.78 MG/DL (ref 0.57–1)
EGFRCR SERPLBLD CKD-EPI 2021: 86.5 ML/MIN/1.73
EOSINOPHIL # BLD AUTO: 0.12 10*3/MM3 (ref 0–0.4)
EOSINOPHIL NFR BLD AUTO: 2.5 % (ref 0.3–6.2)
ERYTHROCYTE [DISTWIDTH] IN BLOOD BY AUTOMATED COUNT: 11.7 % (ref 12.3–15.4)
GLOBULIN SER CALC-MCNC: 2.7 GM/DL
GLUCOSE SERPL-MCNC: 96 MG/DL (ref 65–99)
HCT VFR BLD AUTO: 37.5 % (ref 34–46.6)
HDLC SERPL-MCNC: 90 MG/DL (ref 40–60)
HGB BLD-MCNC: 13 G/DL (ref 12–15.9)
IMM GRANULOCYTES # BLD AUTO: 0.01 10*3/MM3 (ref 0–0.05)
IMM GRANULOCYTES NFR BLD AUTO: 0.2 % (ref 0–0.5)
LDLC SERPL CALC-MCNC: 140 MG/DL (ref 0–100)
LDLC/HDLC SERPL: 1.5 {RATIO}
LYMPHOCYTES # BLD AUTO: 1.88 10*3/MM3 (ref 0.7–3.1)
LYMPHOCYTES NFR BLD AUTO: 39.9 % (ref 19.6–45.3)
MCH RBC QN AUTO: 35.5 PG (ref 26.6–33)
MCHC RBC AUTO-ENTMCNC: 34.7 G/DL (ref 31.5–35.7)
MCV RBC AUTO: 102.5 FL (ref 79–97)
MONOCYTES # BLD AUTO: 0.4 10*3/MM3 (ref 0.1–0.9)
MONOCYTES NFR BLD AUTO: 8.5 % (ref 5–12)
NEUTROPHILS # BLD AUTO: 2.26 10*3/MM3 (ref 1.7–7)
NEUTROPHILS NFR BLD AUTO: 48.1 % (ref 42.7–76)
NRBC BLD AUTO-RTO: 0 /100 WBC (ref 0–0.2)
PLATELET # BLD AUTO: 249 10*3/MM3 (ref 140–450)
POTASSIUM SERPL-SCNC: 4.7 MMOL/L (ref 3.5–5.2)
PROT SERPL-MCNC: 7.1 G/DL (ref 6–8.5)
RBC # BLD AUTO: 3.66 10*6/MM3 (ref 3.77–5.28)
SODIUM SERPL-SCNC: 140 MMOL/L (ref 136–145)
TRIGL SERPL-MCNC: 175 MG/DL (ref 0–150)
TSH SERPL DL<=0.005 MIU/L-ACNC: 2 UIU/ML (ref 0.27–4.2)
VLDLC SERPL CALC-MCNC: 30 MG/DL (ref 5–40)
WBC # BLD AUTO: 4.71 10*3/MM3 (ref 3.4–10.8)

## 2025-04-25 ENCOUNTER — OFFICE VISIT (OUTPATIENT)
Dept: FAMILY MEDICINE CLINIC | Facility: CLINIC | Age: 60
End: 2025-04-25
Payer: COMMERCIAL

## 2025-04-25 VITALS
DIASTOLIC BLOOD PRESSURE: 70 MMHG | RESPIRATION RATE: 20 BRPM | SYSTOLIC BLOOD PRESSURE: 140 MMHG | WEIGHT: 125 LBS | HEIGHT: 66 IN | BODY MASS INDEX: 20.09 KG/M2

## 2025-04-25 DIAGNOSIS — Z00.00 ANNUAL PHYSICAL EXAM: Primary | ICD-10-CM

## 2025-04-25 DIAGNOSIS — K21.9 GASTROESOPHAGEAL REFLUX DISEASE WITHOUT ESOPHAGITIS: ICD-10-CM

## 2025-04-25 DIAGNOSIS — E78.2 MIXED HYPERLIPIDEMIA: Primary | ICD-10-CM

## 2025-04-25 DIAGNOSIS — E78.2 MIXED HYPERLIPIDEMIA: ICD-10-CM

## 2025-04-25 PROCEDURE — 99396 PREV VISIT EST AGE 40-64: CPT | Performed by: FAMILY MEDICINE

## 2025-04-25 RX ORDER — OMEPRAZOLE 40 MG/1
40 CAPSULE, DELAYED RELEASE ORAL DAILY
Qty: 90 CAPSULE | Refills: 3 | Status: SHIPPED | OUTPATIENT
Start: 2025-04-25

## 2025-04-25 RX ORDER — ROSUVASTATIN CALCIUM 20 MG/1
20 TABLET, COATED ORAL
Qty: 90 TABLET | Refills: 3 | Status: SHIPPED | OUTPATIENT
Start: 2025-04-25

## 2025-04-25 NOTE — PROGRESS NOTES
"Chief Complaint  Chief Complaint   Patient presents with    Annual Exam     Physical        Subjective    History of Present Illness        Ayana Silveira presents to [facility] for   History of Present Illness  Ayana Silveira is a 59 y.o. female here for her annual physical with me. Ayana is here for coordination of medical care, to discuss health maintenance, disease prevention as well as to followup on medical problems. Patient has been followed by me since 2023. Patient's last CPE was 2024. Activity level is moderate. Exercises 3 per week. Appetite is good. Feels well with few complaints. Energy level is good. Sleeps fairly well. Patient's last colonoscopy was 2024. She is advised to repeat in 10 years. Patient's last mammogram was 2024.      History of Present Illness      Objective   Vital Signs:   Visit Vitals  /70   Resp 20   Ht 167.6 cm (66\")   Wt 56.7 kg (125 lb)   LMP  (LMP Unknown)   BMI 20.18 kg/m²          BMI is within normal parameters. No other follow-up for BMI required.     Physical Exam  Vitals reviewed.   Constitutional:       General: She is not in acute distress.     Appearance: Normal appearance. She is well-developed and normal weight. She is not ill-appearing.   HENT:      Head: Normocephalic and atraumatic.      Right Ear: Tympanic membrane, ear canal and external ear normal.      Left Ear: Tympanic membrane, ear canal and external ear normal.      Nose: Nose normal.      Mouth/Throat:      Mouth: Mucous membranes are moist.      Pharynx: Oropharynx is clear. No posterior oropharyngeal erythema.   Eyes:      General: Lids are normal.      Extraocular Movements: Extraocular movements intact.      Conjunctiva/sclera: Conjunctivae normal.      Pupils: Pupils are equal, round, and reactive to light.   Cardiovascular:      Rate and Rhythm: Normal rate and regular rhythm.      Pulses: Normal pulses.      Heart sounds: Normal heart sounds. "   Pulmonary:      Effort: Pulmonary effort is normal. No respiratory distress.      Breath sounds: Normal breath sounds. No stridor.   Abdominal:      General: Abdomen is flat. Bowel sounds are normal.      Palpations: Abdomen is soft.   Musculoskeletal:         General: Normal range of motion.      Cervical back: Normal range of motion and neck supple.   Skin:     General: Skin is warm and dry.      Capillary Refill: Capillary refill takes less than 2 seconds.   Neurological:      General: No focal deficit present.      Mental Status: She is alert and oriented to person, place, and time. Mental status is at baseline.      Cranial Nerves: No cranial nerve deficit.      Sensory: No sensory deficit.      Motor: No weakness.      Coordination: Coordination normal.      Gait: Gait normal.      Deep Tendon Reflexes: Reflexes normal.   Psychiatric:         Mood and Affect: Mood normal.         Behavior: Behavior normal.         Thought Content: Thought content normal.         Judgment: Judgment normal.        Physical Exam           Result Review :  Results                            Assessment and Plan      Diagnoses and all orders for this visit:    1. Annual physical exam (Primary)  Assessment & Plan:  Discussed injury prevention, diet and exercise, safe sexual practices, and screening for common diseases. Encouraged use of sunscreen and seatbelts. Discussed timing of  cervical cancer screening. Encouraged monthly self-breast exams, yearly clinical breast exams, and discussed timing of mammograms. Avoidance of tobacco encouraged. Limitation or avoidance of alcohol encouraged. Recommend yearly dental and eye exams. Also discussed monitoring of blood pressure, lipids.      2. Mixed hyperlipidemia  -     rosuvastatin (CRESTOR) 20 MG tablet; Take 1 tablet by mouth every night at bedtime.  Dispense: 90 tablet; Refill: 3    3. Gastroesophageal reflux disease without esophagitis  -     omeprazole (priLOSEC) 40 MG capsule;  Take 1 capsule by mouth Daily.  Dispense: 90 capsule; Refill: 3       Assessment & Plan             Follow Up   No follow-ups on file.  Patient was given instructions and counseling regarding her condition or for health maintenance advice. Please see specific information pulled into the AVS if appropriate.

## 2025-05-09 ENCOUNTER — APPOINTMENT (OUTPATIENT)
Dept: GENERAL RADIOLOGY | Facility: HOSPITAL | Age: 60
End: 2025-05-09
Payer: COMMERCIAL

## 2025-05-09 ENCOUNTER — HOSPITAL ENCOUNTER (EMERGENCY)
Facility: HOSPITAL | Age: 60
Discharge: HOME | End: 2025-05-09
Attending: EMERGENCY MEDICINE
Payer: COMMERCIAL

## 2025-05-09 VITALS
OXYGEN SATURATION: 99 % | HEART RATE: 70 BPM | WEIGHT: 125 LBS | TEMPERATURE: 97.4 F | DIASTOLIC BLOOD PRESSURE: 98 MMHG | RESPIRATION RATE: 16 BRPM | HEIGHT: 66 IN | BODY MASS INDEX: 20.09 KG/M2 | SYSTOLIC BLOOD PRESSURE: 133 MMHG

## 2025-05-09 DIAGNOSIS — R07.9 CHEST PAIN, UNSPECIFIED TYPE: Primary | ICD-10-CM

## 2025-05-09 DIAGNOSIS — F41.9 ANXIETY: ICD-10-CM

## 2025-05-09 LAB
ALBUMIN SERPL-MCNC: 4.9 G/DL (ref 3.5–5.2)
ALBUMIN/GLOB SERPL: 1.5 G/DL
ALP SERPL-CCNC: 48 U/L (ref 39–117)
ALT SERPL W P-5'-P-CCNC: 23 U/L (ref 1–33)
ANION GAP SERPL CALCULATED.3IONS-SCNC: 15.8 MMOL/L (ref 5–15)
AST SERPL-CCNC: 32 U/L (ref 1–32)
BASOPHILS # BLD AUTO: 0.04 10*3/MM3 (ref 0–0.2)
BASOPHILS NFR BLD AUTO: 0.7 % (ref 0–1.5)
BILIRUB SERPL-MCNC: 0.5 MG/DL (ref 0–1.2)
BUN SERPL-MCNC: 8 MG/DL (ref 8–23)
BUN/CREAT SERPL: 10.3 (ref 7–25)
CALCIUM SPEC-SCNC: 9.7 MG/DL (ref 8.6–10.5)
CHLORIDE SERPL-SCNC: 97 MMOL/L (ref 98–107)
CO2 SERPL-SCNC: 24.2 MMOL/L (ref 22–29)
CREAT SERPL-MCNC: 0.78 MG/DL (ref 0.57–1)
DEPRECATED RDW RBC AUTO: 46.4 FL (ref 37–54)
EGFRCR SERPLBLD CKD-EPI 2021: 86.5 ML/MIN/1.73
EOSINOPHIL # BLD AUTO: 0.02 10*3/MM3 (ref 0–0.4)
EOSINOPHIL NFR BLD AUTO: 0.4 % (ref 0.3–6.2)
ERYTHROCYTE [DISTWIDTH] IN BLOOD BY AUTOMATED COUNT: 12.5 % (ref 12.3–15.4)
GEN 5 1HR TROPONIN T REFLEX: <6 NG/L
GLOBULIN UR ELPH-MCNC: 3.2 GM/DL
GLUCOSE SERPL-MCNC: 150 MG/DL (ref 65–99)
HCT VFR BLD AUTO: 39.3 % (ref 34–46.6)
HGB BLD-MCNC: 13.5 G/DL (ref 12–15.9)
HOLD SPECIMEN: NORMAL
HOLD SPECIMEN: NORMAL
IMM GRANULOCYTES # BLD AUTO: 0.01 10*3/MM3 (ref 0–0.05)
IMM GRANULOCYTES NFR BLD AUTO: 0.2 % (ref 0–0.5)
LYMPHOCYTES # BLD AUTO: 1.6 10*3/MM3 (ref 0.7–3.1)
LYMPHOCYTES NFR BLD AUTO: 29.3 % (ref 19.6–45.3)
MCH RBC QN AUTO: 35 PG (ref 26.6–33)
MCHC RBC AUTO-ENTMCNC: 34.4 G/DL (ref 31.5–35.7)
MCV RBC AUTO: 101.8 FL (ref 79–97)
MONOCYTES # BLD AUTO: 0.38 10*3/MM3 (ref 0.1–0.9)
MONOCYTES NFR BLD AUTO: 6.9 % (ref 5–12)
NEUTROPHILS NFR BLD AUTO: 3.42 10*3/MM3 (ref 1.7–7)
NEUTROPHILS NFR BLD AUTO: 62.5 % (ref 42.7–76)
NRBC BLD AUTO-RTO: 0 /100 WBC (ref 0–0.2)
NT-PROBNP SERPL-MCNC: 72.5 PG/ML (ref 0–900)
PLATELET # BLD AUTO: 204 10*3/MM3 (ref 140–450)
PMV BLD AUTO: 9.7 FL (ref 6–12)
POTASSIUM SERPL-SCNC: 3.5 MMOL/L (ref 3.5–5.2)
PROT SERPL-MCNC: 8.1 G/DL (ref 6–8.5)
RBC # BLD AUTO: 3.86 10*6/MM3 (ref 3.77–5.28)
SODIUM SERPL-SCNC: 137 MMOL/L (ref 136–145)
TROPONIN T NUMERIC DELTA: NORMAL
TROPONIN T SERPL HS-MCNC: <6 NG/L
WBC NRBC COR # BLD AUTO: 5.47 10*3/MM3 (ref 3.4–10.8)
WHOLE BLOOD HOLD COAG: NORMAL
WHOLE BLOOD HOLD SPECIMEN: NORMAL

## 2025-05-09 PROCEDURE — 93010 ELECTROCARDIOGRAM REPORT: CPT | Performed by: INTERNAL MEDICINE

## 2025-05-09 PROCEDURE — 84484 ASSAY OF TROPONIN QUANT: CPT | Performed by: EMERGENCY MEDICINE

## 2025-05-09 PROCEDURE — 71045 X-RAY EXAM CHEST 1 VIEW: CPT

## 2025-05-09 PROCEDURE — 84484 ASSAY OF TROPONIN QUANT: CPT

## 2025-05-09 PROCEDURE — 93005 ELECTROCARDIOGRAM TRACING: CPT

## 2025-05-09 PROCEDURE — 85025 COMPLETE CBC W/AUTO DIFF WBC: CPT

## 2025-05-09 PROCEDURE — 80053 COMPREHEN METABOLIC PANEL: CPT

## 2025-05-09 PROCEDURE — 83880 ASSAY OF NATRIURETIC PEPTIDE: CPT

## 2025-05-09 PROCEDURE — 99284 EMERGENCY DEPT VISIT MOD MDM: CPT

## 2025-05-09 PROCEDURE — 93005 ELECTROCARDIOGRAM TRACING: CPT | Performed by: EMERGENCY MEDICINE

## 2025-05-09 PROCEDURE — 36415 COLL VENOUS BLD VENIPUNCTURE: CPT

## 2025-05-09 RX ORDER — HYDROXYZINE HYDROCHLORIDE 25 MG/1
25 TABLET, FILM COATED ORAL 3 TIMES DAILY PRN
Qty: 30 TABLET | Refills: 0 | Status: SHIPPED | OUTPATIENT
Start: 2025-05-09

## 2025-05-09 RX ORDER — SODIUM CHLORIDE 0.9 % (FLUSH) 0.9 %
10 SYRINGE (ML) INJECTION AS NEEDED
Status: DISCONTINUED | OUTPATIENT
Start: 2025-05-09 | End: 2025-05-09 | Stop reason: HOSPADM

## 2025-05-09 RX ORDER — HYDROXYZINE HYDROCHLORIDE 25 MG/1
25 TABLET, FILM COATED ORAL 3 TIMES DAILY PRN
Status: DISCONTINUED | OUTPATIENT
Start: 2025-05-09 | End: 2025-05-09 | Stop reason: HOSPADM

## 2025-05-09 RX ADMIN — HYDROXYZINE HYDROCHLORIDE 25 MG: 25 TABLET, FILM COATED ORAL at 13:22

## 2025-05-09 NOTE — ED PROVIDER NOTES
EMERGENCY DEPARTMENT ENCOUNTER    Room Number:  08/08  Date of encounter:  5/9/2025  PCP: Aureliano Devine Sr., MD  Historian: Patient  Full history not obtainable due to: None    HPI:  Chief Complaint: Chest tightness, panic attack    Context: Ayana Silveira is a 59 y.o. female with a PMH significant for generalized anxiety disorder, panic attacks, hyperlipidemia who presents to the ED c/o chest tightness that began around 9 AM this morning.  Patient states that she has a history of panic attacks, felt so she has been to have 1 earlier this morning, felt her chest tightening, began to have some shortness of breath and felt nauseous and lightheaded.  Patient says that this is usually something she can control, this went on for several hours, nurse that she was with recommended she comes to the emergency department for assessment.  Patient now says she feels better, chest tightness is only 1 out of 10, does still feel very anxious, does not normally take anything for anxiety.    Patient is denying edwige chest pain, denies shortness of breath at this time, denies nausea at this time, denies abdominal pain, lightheadedness  Patient denies recent travel, surgical operations, extended periods of immobility.  There is no hormone use, history of blood clots or clotting disorders.  Patient has not currently undergoing treatment for cancer.         MEDICAL RECORD REVIEW:    Upon review of the medical record it appears the patient was evaluated 4/3/2025.  The patient had a normal CMP and TSH.    PAST MEDICAL HISTORY    Active Ambulatory Problems     Diagnosis Date Noted    HLD (hyperlipidemia) 03/01/2021    Left sided numbness 09/24/2023    Stroke-like symptoms 10/28/2023    Anxiety 10/28/2023    Paresthesias 12/14/2023    Generalized anxiety disorder 12/14/2023    Hospital discharge follow-up 12/14/2023    Screening for colorectal cancer 02/01/2024    Annual physical exam 03/28/2024    Gastroesophageal reflux disease  without esophagitis 10/24/2024     Resolved Ambulatory Problems     Diagnosis Date Noted    Muscle strain of upper extremity 2021     Past Medical History:   Diagnosis Date    Abnormal Pap smear of cervix     GERD (gastroesophageal reflux disease)     History of COVID-19     Hyperlipidemia     Peptic ulcer     Post-menopausal     Urinary tract infection 2021    Varicella 1986         PAST SURGICAL HISTORY  Past Surgical History:   Procedure Laterality Date    CATARACT EXTRACTION Bilateral     COLONOSCOPY  2014    COLONOSCOPY N/A 2024    Procedure: COLONOSCOPY TO CECUM/TI;  Surgeon: Jeronimo Salas MD;  Location: [facility] ENDOSCOPY;  Service: Gastroenterology;  Laterality: N/A;  SCREENING  POST: HEMORRHOIDS    ORIF ULNAR / RADIAL SHAFT FRACTURE      TONSILLECTOMY      WISDOM TOOTH EXTRACTION           FAMILY HISTORY  Family History   Problem Relation Age of Onset    Breast cancer Maternal Grandmother     Breast cancer Paternal Grandmother     Coronary artery disease Mother         stent    Atrial fibrillation Mother     Gallbladder disease Mother     Anxiety disorder Mother     Arthritis Father     Coronary artery disease Father         s/p stent and cabg     Hypertension Father     Anxiety disorder Sister     Hyperlipidemia Brother          SOCIAL HISTORY  Social History     Socioeconomic History    Marital status:      Spouse name: Omid Dewey    Number of children: 3   Tobacco Use    Smoking status: Former     Current packs/day: 0.00     Types: Cigarettes     Quit date: 1983     Years since quittin.3    Smokeless tobacco: Never    Tobacco comments:     just light smoker through college.    Vaping Use    Vaping status: Never Used   Substance and Sexual Activity    Alcohol use: Yes     Alcohol/week: 18.0 standard drinks of alcohol     Types: 14 Glasses of wine, 4 Drinks containing 0.5 oz of alcohol per week     Comment: two glasses of wine per day    Drug use: Never     Sexual activity: Yes     Partners: Male     Birth control/protection: Post-menopausal         ALLERGIES  Sulfa antibiotics and Scallops        REVIEW OF SYSTEMS    All systems reviewed and marked as negative except as listed in HPI     PHYSICAL EXAM    I have reviewed the triage vital signs and nursing notes.    ED Triage Vitals   Temp Heart Rate Resp BP SpO2   05/09/25 1207 05/09/25 1207 05/09/25 1207 05/09/25 1219 05/09/25 1207   97 °F (36.1 °C) (!) 123 16 163/89 99 %      Temp src Heart Rate Source Patient Position BP Location FiO2 (%)   -- 05/09/25 1207 -- -- --    Monitor          Physical Exam  Constitutional:       General: She is not in acute distress.     Appearance: Normal appearance. She is not ill-appearing.   HENT:      Head: Normocephalic and atraumatic.      Nose: Nose normal.   Eyes:      Extraocular Movements: Extraocular movements intact.      Pupils: Pupils are equal, round, and reactive to light.   Cardiovascular:      Rate and Rhythm: Normal rate and regular rhythm.      Pulses: Normal pulses.      Heart sounds: Normal heart sounds.   Pulmonary:      Effort: Pulmonary effort is normal. No respiratory distress.      Breath sounds: Normal breath sounds.   Abdominal:      General: Abdomen is flat.      Palpations: Abdomen is soft.   Skin:     General: Skin is warm and dry.      Coloration: Skin is not jaundiced.   Neurological:      Mental Status: She is alert and oriented to person, place, and time.   Psychiatric:         Mood and Affect: Mood is anxious.         Behavior: Behavior normal.         Thought Content: Thought content normal.         Vital signs and nursing notes reviewed.            LAB RESULTS  Recent Results (from the past 24 hours)   Comprehensive Metabolic Panel    Collection Time: 05/09/25 12:13 PM    Specimen: Arm, Right; Blood   Result Value Ref Range    Glucose 150 (H) 65 - 99 mg/dL    BUN 8 8 - 23 mg/dL    Creatinine 0.78 0.57 - 1.00 mg/dL    Sodium 137 136 - 145 mmol/L     Potassium 3.5 3.5 - 5.2 mmol/L    Chloride 97 (L) 98 - 107 mmol/L    CO2 24.2 22.0 - 29.0 mmol/L    Calcium 9.7 8.6 - 10.5 mg/dL    Total Protein 8.1 6.0 - 8.5 g/dL    Albumin 4.9 3.5 - 5.2 g/dL    ALT (SGPT) 23 1 - 33 U/L    AST (SGOT) 32 1 - 32 U/L    Alkaline Phosphatase 48 39 - 117 U/L    Total Bilirubin 0.5 0.0 - 1.2 mg/dL    Globulin 3.2 gm/dL    A/G Ratio 1.5 g/dL    BUN/Creatinine Ratio 10.3 7.0 - 25.0    Anion Gap 15.8 (H) 5.0 - 15.0 mmol/L    eGFR 86.5 >60.0 mL/min/1.73   High Sensitivity Troponin T    Collection Time: 05/09/25 12:13 PM    Specimen: Arm, Right; Blood   Result Value Ref Range    HS Troponin T <6 <14 ng/L   BNP    Collection Time: 05/09/25 12:13 PM    Specimen: Arm, Right; Blood   Result Value Ref Range    proBNP 72.5 0.0 - 900.0 pg/mL   Green Top (Gel)    Collection Time: 05/09/25 12:13 PM   Result Value Ref Range    Extra Tube Hold for add-ons.    Lavender Top    Collection Time: 05/09/25 12:13 PM   Result Value Ref Range    Extra Tube hold for add-on    Gold Top - SST    Collection Time: 05/09/25 12:13 PM   Result Value Ref Range    Extra Tube Hold for add-ons.    Light Blue Top    Collection Time: 05/09/25 12:13 PM   Result Value Ref Range    Extra Tube Hold for add-ons.    CBC Auto Differential    Collection Time: 05/09/25 12:13 PM    Specimen: Arm, Right; Blood   Result Value Ref Range    WBC 5.47 3.40 - 10.80 10*3/mm3    RBC 3.86 3.77 - 5.28 10*6/mm3    Hemoglobin 13.5 12.0 - 15.9 g/dL    Hematocrit 39.3 34.0 - 46.6 %    .8 (H) 79.0 - 97.0 fL    MCH 35.0 (H) 26.6 - 33.0 pg    MCHC 34.4 31.5 - 35.7 g/dL    RDW 12.5 12.3 - 15.4 %    RDW-SD 46.4 37.0 - 54.0 fl    MPV 9.7 6.0 - 12.0 fL    Platelets 204 140 - 450 10*3/mm3    Neutrophil % 62.5 42.7 - 76.0 %    Lymphocyte % 29.3 19.6 - 45.3 %    Monocyte % 6.9 5.0 - 12.0 %    Eosinophil % 0.4 0.3 - 6.2 %    Basophil % 0.7 0.0 - 1.5 %    Immature Grans % 0.2 0.0 - 0.5 %    Neutrophils, Absolute 3.42 1.70 - 7.00 10*3/mm3    Lymphocytes,  Absolute 1.60 0.70 - 3.10 10*3/mm3    Monocytes, Absolute 0.38 0.10 - 0.90 10*3/mm3    Eosinophils, Absolute 0.02 0.00 - 0.40 10*3/mm3    Basophils, Absolute 0.04 0.00 - 0.20 10*3/mm3    Immature Grans, Absolute 0.01 0.00 - 0.05 10*3/mm3    nRBC 0.0 0.0 - 0.2 /100 WBC   ECG 12 Lead ED Triage Standing Order; Chest Pain    Collection Time: 05/09/25 12:16 PM   Result Value Ref Range    QT Interval 403 ms    QTC Interval 461 ms   High Sensitivity Troponin T 1Hr    Collection Time: 05/09/25  1:18 PM    Specimen: Blood   Result Value Ref Range    HS Troponin T <6 <14 ng/L    Troponin T Numeric Delta         Ordered the above labs and independently reviewed the results.        RADIOLOGY  XR Chest 1 View  Result Date: 5/9/2025  XR CHEST 1 VW-5/9/2025  HISTORY: Chest pain.  Heart size is within normal limits. Lungs appear free of acute infiltrates. Bones and soft tissues are unremarkable.      1. No acute process.   This report was finalized on 5/9/2025 1:16 PM by Dr. Pepe Bray M.D on Workstation: [id number]        I ordered the above noted radiological studies. Independently reviewed by me and discussed with radiologist.  See dictation above for official radiology interpretation.      MEDICATIONS GIVEN IN ER    Medications   sodium chloride 0.9 % flush 10 mL (has no administration in time range)   hydrOXYzine (ATARAX) tablet 25 mg (25 mg Oral Given 5/9/25 1322)         PROGRESS, DATA ANALYSIS, CONSULTS, AND MEDICAL DECISION MAKING    All labs have been independently interpreted by me.  All radiology studies have been interpreted by me.  Discussion below represents my analysis of pertinent findings related to patient's condition, differential diagnosis, treatment plan and final disposition.    Patient had negative cardiac workup with a heart score of 2.  Patient discharged, offered observation but declined.  Patient sent home with prescription to hydroxyzine.    - Chronic or social conditions impacting care:  Anxiety      DIFFERENTIAL DIAGNOSIS INCLUDE BUT NOT LIMITED TO: Differential diagnosis includes but is not limited to:  -acute coronary syndrome  -pulmonary embolism  -thoracic aortic dissection  -pneumonia  -pneumothorax  -musculoskeletal pain  -GERD  -esophageal spasm  -anxiety  -myocarditis/pericarditis  -esophageal rupture  -pancreatitis.           Orders placed during this visit:  Orders Placed This Encounter   Procedures    XR Chest 1 View    [place] Draw    Comprehensive Metabolic Panel    High Sensitivity Troponin T    BNP    CBC Auto Differential    High Sensitivity Troponin T 1Hr    NPO Diet NPO Type: Strict NPO    Undress & Gown    Continuous Pulse Oximetry    Oxygen Therapy- Nasal Cannula; Titrate 1-6 LPM Per SpO2; 90 - 95%    ECG 12 Lead ED Triage Standing Order; Chest Pain    ECG 12 Lead ED Triage Standing Order; Chest Pain    Insert Peripheral IV    CBC & Differential    Green Top (Gel)    Lavender Top    Gold Top - SST    Light Blue Top         ED Course as of 05/09/25 1552   Fri May 09, 2025   1306 Patient requesting medication for anxiety, ordering hydroxyzine [CV]   1319 EKG ER MD interpretation   Time: 12: 16  Rhythm and rate: Normal sinus rhythm rate of 79  Axis: Normal  P waves: Normal except for probable left atrial enlargement  QRS complexes: Normal  ST segments: no elevation nor depressions  T waves: Isolated inversion in V1   [AR]   1320 I viewed patient's chest x-ray and on my interpretation patient has normal heart size and no pulmonary consolidation [AR]   1455 Reassessed patient, patient feels much better following hydroxyzine, no further cardiac complaints or difficulty breathing.  Will discharge pending repeat troponin [CV]   1533 HS Troponin T: <6 [CV]      ED Course User Index  [AR] Lizzie Glover MD  [CV] Randy Delatorre PATAHIRA       AS OF 15:52 EDT VITALS:    BP - 153/86  HR - 70  TEMP - 97 °F (36.1 °C)  02 SATS - 99%    I rechecked the patient.  I discussed the  patient's labs, radiology findings (including all incidental findings), diagnosis, and plan for discharge.  A repeat exam reveals no new worrisome changes from my initial exam findings.  The patient understands that the fact that they are being discharged does not denote that nothing is abnormal, it indicates that no clinical emergency is present and that they must follow-up as directed in order to properly maintain their health.  Follow-up instructions (specifically listed below) and return to ER precautions were given at this time.  I specifically instructed the patient to follow-up with their PCP.  The patient understands and agrees with the plan, and is ready for discharge.  All questions answered.    Aureliano Devine Sr., MD  [address]  [address] [place], [place] [id number] [phone]    Call in 2 days  As needed         Medication List        New Prescriptions      hydrOXYzine 25 MG tablet  Commonly known as: ATARAX  Take 1 tablet by mouth 3 (Three) Times a Day As Needed for Anxiety.               Where to Get Your Medications        These medications were sent to [facility] [id number] [place], [place] - [address] [address] [facility] - [phone] [facility] [phone]   [address] [address], [facility] [address]      Phone: [phone]   hydrOXYzine 25 MG tablet       DIAGNOSIS  Final diagnoses:   Chest pain, unspecified type   Anxiety         DISPOSITION  Discharge    Pt masked in first look. I wore a surgical mask throughout my encounters with the pt. I performed hand hygiene on entry into the pt room and upon exit.     Dictated utilizing Dragon dictation     Note Disclaimer: At [facility], we believe that sharing information builds trust and better relationships. You are receiving this note because you recently visited [facility]. It is possible you will see health information before a provider has talked with you about it. This kind of information can be easy to  misunderstand. To help you fully understand what it means for your health, we urge you to discuss this note with your provider.      Randy Delatorre PA-C  05/09/25 1551

## 2025-05-09 NOTE — ED PROVIDER NOTES
MD ATTESTATION NOTE    SHARED VISIT: This visit was performed by BOTH a physician and an APC. The substantive portion of the medical decision making was performed by this attesting physician who made or approved the management plan and takes responsibility for patient management. All studies in the APC note (if performed) were independently interpreted by me.     The OBI and I have discussed this patient's history, physical exam, and treatment plan.  I have reviewed the documentation and affirm the documentation and agree with the treatment and plan.  The attached note describes my personal findings.      Independent Historians: Patient    A complete HPI/ROS/PMH/PSH/SH/FH are unobtainable due to: None    Chronic or social conditions impacting patient care (social determinants of health): None    Ayana Silveira is a 59 y.o. female who presents to the ED c/o acute chest tightness that started this morning.  Patient with a history of anxiety and panic attacks that she is usually to get control of at home.  However, patient's symptoms went on for several hours this morning so she decided to come to the ER.  Patient now feeling better with only 1 out of 10 chest discomfort but does feel internally very anxious still.  Patient denies any shortness of breath, cough, fevers.          On exam:  GENERAL: Pleasant cooperative and conversant female, alert, no acute distress  SKIN: Warm, dry  HENT: Normocephalic, atraumatic  EYES: no scleral icterus  CV: regular rhythm, regular rate  RESPIRATORY: normal effort, lungs clear, no wheezing  NEURO: alert, moves all extremities, follows commands                                                             Labs  Recent Results (from the past 24 hours)   Comprehensive Metabolic Panel    Collection Time: 05/09/25 12:13 PM    Specimen: Arm, Right; Blood   Result Value Ref Range    Glucose 150 (H) 65 - 99 mg/dL    BUN 8 8 - 23 mg/dL    Creatinine 0.78 0.57 - 1.00 mg/dL    Sodium 137 136  - 145 mmol/L    Potassium 3.5 3.5 - 5.2 mmol/L    Chloride 97 (L) 98 - 107 mmol/L    CO2 24.2 22.0 - 29.0 mmol/L    Calcium 9.7 8.6 - 10.5 mg/dL    Total Protein 8.1 6.0 - 8.5 g/dL    Albumin 4.9 3.5 - 5.2 g/dL    ALT (SGPT) 23 1 - 33 U/L    AST (SGOT) 32 1 - 32 U/L    Alkaline Phosphatase 48 39 - 117 U/L    Total Bilirubin 0.5 0.0 - 1.2 mg/dL    Globulin 3.2 gm/dL    A/G Ratio 1.5 g/dL    BUN/Creatinine Ratio 10.3 7.0 - 25.0    Anion Gap 15.8 (H) 5.0 - 15.0 mmol/L    eGFR 86.5 >60.0 mL/min/1.73   High Sensitivity Troponin T    Collection Time: 05/09/25 12:13 PM    Specimen: Arm, Right; Blood   Result Value Ref Range    HS Troponin T <6 <14 ng/L   BNP    Collection Time: 05/09/25 12:13 PM    Specimen: Arm, Right; Blood   Result Value Ref Range    proBNP 72.5 0.0 - 900.0 pg/mL   Green Top (Gel)    Collection Time: 05/09/25 12:13 PM   Result Value Ref Range    Extra Tube Hold for add-ons.    Lavender Top    Collection Time: 05/09/25 12:13 PM   Result Value Ref Range    Extra Tube hold for add-on    Gold Top - SST    Collection Time: 05/09/25 12:13 PM   Result Value Ref Range    Extra Tube Hold for add-ons.    Light Blue Top    Collection Time: 05/09/25 12:13 PM   Result Value Ref Range    Extra Tube Hold for add-ons.    CBC Auto Differential    Collection Time: 05/09/25 12:13 PM    Specimen: Arm, Right; Blood   Result Value Ref Range    WBC 5.47 3.40 - 10.80 10*3/mm3    RBC 3.86 3.77 - 5.28 10*6/mm3    Hemoglobin 13.5 12.0 - 15.9 g/dL    Hematocrit 39.3 34.0 - 46.6 %    .8 (H) 79.0 - 97.0 fL    MCH 35.0 (H) 26.6 - 33.0 pg    MCHC 34.4 31.5 - 35.7 g/dL    RDW 12.5 12.3 - 15.4 %    RDW-SD 46.4 37.0 - 54.0 fl    MPV 9.7 6.0 - 12.0 fL    Platelets 204 140 - 450 10*3/mm3    Neutrophil % 62.5 42.7 - 76.0 %    Lymphocyte % 29.3 19.6 - 45.3 %    Monocyte % 6.9 5.0 - 12.0 %    Eosinophil % 0.4 0.3 - 6.2 %    Basophil % 0.7 0.0 - 1.5 %    Immature Grans % 0.2 0.0 - 0.5 %    Neutrophils, Absolute 3.42 1.70 - 7.00 10*3/mm3     Lymphocytes, Absolute 1.60 0.70 - 3.10 10*3/mm3    Monocytes, Absolute 0.38 0.10 - 0.90 10*3/mm3    Eosinophils, Absolute 0.02 0.00 - 0.40 10*3/mm3    Basophils, Absolute 0.04 0.00 - 0.20 10*3/mm3    Immature Grans, Absolute 0.01 0.00 - 0.05 10*3/mm3    nRBC 0.0 0.0 - 0.2 /100 WBC   ECG 12 Lead ED Triage Standing Order; Chest Pain    Collection Time: 05/09/25 12:16 PM   Result Value Ref Range    QT Interval 403 ms    QTC Interval 461 ms   High Sensitivity Troponin T 1Hr    Collection Time: 05/09/25  1:18 PM    Specimen: Blood   Result Value Ref Range    HS Troponin T <6 <14 ng/L    Troponin T Numeric Delta         Radiology  XR Chest 1 View  Result Date: 5/9/2025  XR CHEST 1 VW-5/9/2025  HISTORY: Chest pain.  Heart size is within normal limits. Lungs appear free of acute infiltrates. Bones and soft tissues are unremarkable.      1. No acute process.   This report was finalized on 5/9/2025 1:16 PM by Dr. Pepe Bray M.D on Workstation: [id number]        Medical Decision Making:  ED Course as of 05/09/25 1918   Fri May 09, 2025   1306 Patient requesting medication for anxiety, ordering hydroxyzine [CV]   1319 EKG ER MD interpretation   Time: 12: 16  Rhythm and rate: Normal sinus rhythm rate of 79  Axis: Normal  P waves: Normal except for probable left atrial enlargement  QRS complexes: Normal  ST segments: no elevation nor depressions  T waves: Isolated inversion in V1   [AR]   1320 I viewed patient's chest x-ray and on my interpretation patient has normal heart size and no pulmonary consolidation [AR]   1455 Reassessed patient, patient feels much better following hydroxyzine, no further cardiac complaints or difficulty breathing.  Will discharge pending repeat troponin [CV]   1533 HS Troponin T: <6 [CV]      ED Course User Index  [AR] Lizzie Glover MD  [CV] Randy Delatorre, PANunoC       MDM: This patient presents with chest pain that is more atypical in nature, and most likely secondary to  non-cardiac causes. The differential diagnosis includes emergent causes like ACS, PE, pericarditis, myocarditis, thoracic aortic dissection, pneumothorax, pulmonary effusion, and pneumonia. It also includes more benign causes like bronchitis, gerd, esophageal spasm, anxiety/panic, pleurisy, costochondritis/chest wall pain, and biliary colic. I have low suspicion for acute PE (no PE risk factors, no shortness of breath), pericarditis / myocarditis (no preceeding uri), thoracic aortic dissection (lack of risk factors and grossly equal radial pulses on exam), pneumothorax (clear to auscultation bilaterally to bases on exam), pneumonia or other acute infectious process (lack of infectious symptoms like cough). Plan to order CXR, serum labs including screening troponin, and EKG while monitoring patient and providing pain control as needed. Will reassess.    Procedures:  Procedures        PPE: I followed hospital protocols for proper PPE based on patient presentation including use of N95 mask for suspected infectious respiratory conditions.  Proper hand hygiene was performed both before and after the patient encounter.          Diagnosis  Final diagnoses:   Chest pain, unspecified type   Anxiety       Note Disclaimer: At [facility], we believe that sharing information builds trust and better relationships. You are receiving this note because you recently visited [facility]. It is possible you will see health information before a provider has talked with you about it. This kind of information can be easy to misunderstand. To help you fully understand what it means for your health, we urge you to discuss this note with your provider.       Lizzie Glover MD  05/10/25 3476

## 2025-05-11 LAB
QT INTERVAL: 403 MS
QTC INTERVAL: 461 MS

## 2025-08-20 DIAGNOSIS — E78.2 MIXED HYPERLIPIDEMIA: ICD-10-CM

## 2025-08-22 RX ORDER — ROSUVASTATIN CALCIUM 20 MG/1
20 TABLET, COATED ORAL
Qty: 90 TABLET | Refills: 3 | Status: SHIPPED | OUTPATIENT
Start: 2025-08-22

## 2025-08-26 DIAGNOSIS — K21.9 GASTROESOPHAGEAL REFLUX DISEASE WITHOUT ESOPHAGITIS: ICD-10-CM

## 2025-08-29 RX ORDER — OMEPRAZOLE 40 MG/1
40 CAPSULE, DELAYED RELEASE ORAL DAILY
Qty: 90 CAPSULE | Refills: 2 | Status: SHIPPED | OUTPATIENT
Start: 2025-08-29

## (undated) DEVICE — ADAPT CLN BIOGUARD AIR/H2O DISP

## (undated) DEVICE — LN SMPL CO2 SHTRM SD STREAM W/M LUER

## (undated) DEVICE — TUBING, SUCTION, 1/4" X 10', STRAIGHT: Brand: MEDLINE

## (undated) DEVICE — KT ORCA ORCAPOD DISP STRL

## (undated) DEVICE — CANN O2 ETCO2 FITS ALL CONN CO2 SMPL A/ 7IN DISP LF

## (undated) DEVICE — SENSR O2 OXIMAX FNGR A/ 18IN NONSTR

## (undated) DEVICE — CANNULA,OXY,ADULT,SUPER SOFT,W/14'TUB,UC: Brand: MEDLINE INDUSTRIES, INC.